# Patient Record
Sex: MALE | Race: WHITE | NOT HISPANIC OR LATINO | Employment: FULL TIME | URBAN - METROPOLITAN AREA
[De-identification: names, ages, dates, MRNs, and addresses within clinical notes are randomized per-mention and may not be internally consistent; named-entity substitution may affect disease eponyms.]

---

## 2020-10-27 ENCOUNTER — HOSPITAL ENCOUNTER (EMERGENCY)
Facility: HOSPITAL | Age: 55
Discharge: HOME | End: 2020-10-27
Attending: EMERGENCY MEDICINE
Payer: COMMERCIAL

## 2020-10-27 ENCOUNTER — OFFICE VISIT (OUTPATIENT)
Dept: URGENT CARE | Facility: CLINIC | Age: 55
End: 2020-10-27
Payer: COMMERCIAL

## 2020-10-27 ENCOUNTER — APPOINTMENT (EMERGENCY)
Dept: CT IMAGING | Facility: HOSPITAL | Age: 55
End: 2020-10-27
Payer: COMMERCIAL

## 2020-10-27 VITALS
DIASTOLIC BLOOD PRESSURE: 89 MMHG | BODY MASS INDEX: 29.03 KG/M2 | WEIGHT: 185 LBS | SYSTOLIC BLOOD PRESSURE: 171 MMHG | HEIGHT: 67 IN | OXYGEN SATURATION: 98 % | RESPIRATION RATE: 18 BRPM | HEART RATE: 92 BPM | TEMPERATURE: 98.3 F

## 2020-10-27 VITALS
DIASTOLIC BLOOD PRESSURE: 93 MMHG | WEIGHT: 185 LBS | RESPIRATION RATE: 18 BRPM | OXYGEN SATURATION: 95 % | BODY MASS INDEX: 29.03 KG/M2 | HEART RATE: 93 BPM | SYSTOLIC BLOOD PRESSURE: 190 MMHG | HEIGHT: 67 IN | TEMPERATURE: 97.6 F

## 2020-10-27 DIAGNOSIS — R10.12 LEFT UPPER QUADRANT ABDOMINAL PAIN: Primary | ICD-10-CM

## 2020-10-27 DIAGNOSIS — R10.9 ABDOMINAL PAIN, UNSPECIFIED ABDOMINAL LOCATION: Primary | ICD-10-CM

## 2020-10-27 LAB
ALBUMIN SERPL BCP-MCNC: 3.9 G/DL (ref 3.5–5.7)
ALP SERPL-CCNC: 48 U/L (ref 40–150)
ALT SERPL W P-5'-P-CCNC: 21 U/L (ref 7–52)
ANION GAP SERPL CALCULATED.3IONS-SCNC: 8 MMOL/L (ref 4–13)
APTT PPP: 29 SECONDS (ref 23–37)
AST SERPL W P-5'-P-CCNC: 12 U/L (ref 13–39)
BASOPHILS # BLD AUTO: 0.1 THOUSANDS/ΜL (ref 0–0.1)
BASOPHILS NFR BLD AUTO: 1 % (ref 0–2)
BILIRUB SERPL-MCNC: 0.4 MG/DL (ref 0.2–1)
BUN SERPL-MCNC: 17 MG/DL (ref 7–25)
CALCIUM SERPL-MCNC: 9.2 MG/DL (ref 8.6–10.5)
CHLORIDE SERPL-SCNC: 100 MMOL/L (ref 98–107)
CO2 SERPL-SCNC: 27 MMOL/L (ref 21–31)
CREAT SERPL-MCNC: 0.98 MG/DL (ref 0.7–1.3)
EOSINOPHIL # BLD AUTO: 0.7 THOUSAND/ΜL (ref 0–0.61)
EOSINOPHIL NFR BLD AUTO: 5 % (ref 0–5)
ERYTHROCYTE [DISTWIDTH] IN BLOOD BY AUTOMATED COUNT: 13.2 % (ref 11.5–14.5)
GFR SERPL CREATININE-BSD FRML MDRD: 85 ML/MIN/1.73SQ M
GLUCOSE SERPL-MCNC: 276 MG/DL (ref 65–99)
GLUCOSE SERPL-MCNC: 309 MG/DL (ref 65–140)
HCT VFR BLD AUTO: 41.7 % (ref 42–47)
HGB BLD-MCNC: 14.4 G/DL (ref 14–18)
INR PPP: 1.06 (ref 0.84–1.19)
LIPASE SERPL-CCNC: 12 U/L (ref 11–82)
LYMPHOCYTES # BLD AUTO: 5.3 THOUSANDS/ΜL (ref 0.6–4.47)
LYMPHOCYTES NFR BLD AUTO: 41 % (ref 21–51)
MCH RBC QN AUTO: 31.9 PG (ref 26–34)
MCHC RBC AUTO-ENTMCNC: 34.5 G/DL (ref 31–37)
MCV RBC AUTO: 92 FL (ref 81–99)
MONOCYTES # BLD AUTO: 0.9 THOUSAND/ΜL (ref 0.17–1.22)
MONOCYTES NFR BLD AUTO: 7 % (ref 2–12)
NEUTROPHILS # BLD AUTO: 6 THOUSANDS/ΜL (ref 1.4–6.5)
NEUTS SEG NFR BLD AUTO: 46 % (ref 42–75)
PLATELET # BLD AUTO: 224 THOUSANDS/UL (ref 149–390)
PMV BLD AUTO: 7.7 FL (ref 8.6–11.7)
POTASSIUM SERPL-SCNC: 4 MMOL/L (ref 3.5–5.5)
PROT SERPL-MCNC: 6.7 G/DL (ref 6.4–8.9)
PROTHROMBIN TIME: 13.7 SECONDS (ref 11.6–14.5)
RBC # BLD AUTO: 4.52 MILLION/UL (ref 4.3–5.9)
SL AMB  POCT GLUCOSE, UA: 2000
SL AMB LEUKOCYTE ESTERASE,UA: ABNORMAL
SL AMB POCT BILIRUBIN,UA: ABNORMAL
SL AMB POCT BLOOD,UA: ABNORMAL
SL AMB POCT CLARITY,UA: CLEAR
SL AMB POCT COLOR,UA: ABNORMAL
SL AMB POCT KETONES,UA: ABNORMAL
SL AMB POCT NITRITE,UA: ABNORMAL
SL AMB POCT PH,UA: 6
SL AMB POCT SPECIFIC GRAVITY,UA: 1.02
SL AMB POCT URINE PROTEIN: 30
SL AMB POCT UROBILINOGEN: 0.2
SODIUM SERPL-SCNC: 135 MMOL/L (ref 134–143)
WBC # BLD AUTO: 12.9 THOUSAND/UL (ref 4.8–10.8)

## 2020-10-27 PROCEDURE — 85025 COMPLETE CBC W/AUTO DIFF WBC: CPT | Performed by: EMERGENCY MEDICINE

## 2020-10-27 PROCEDURE — 99284 EMERGENCY DEPT VISIT MOD MDM: CPT

## 2020-10-27 PROCEDURE — 81002 URINALYSIS NONAUTO W/O SCOPE: CPT | Performed by: PHYSICIAN ASSISTANT

## 2020-10-27 PROCEDURE — 83690 ASSAY OF LIPASE: CPT | Performed by: EMERGENCY MEDICINE

## 2020-10-27 PROCEDURE — 82948 REAGENT STRIP/BLOOD GLUCOSE: CPT | Performed by: PHYSICIAN ASSISTANT

## 2020-10-27 PROCEDURE — 85730 THROMBOPLASTIN TIME PARTIAL: CPT | Performed by: EMERGENCY MEDICINE

## 2020-10-27 PROCEDURE — 74177 CT ABD & PELVIS W/CONTRAST: CPT

## 2020-10-27 PROCEDURE — 36415 COLL VENOUS BLD VENIPUNCTURE: CPT | Performed by: EMERGENCY MEDICINE

## 2020-10-27 PROCEDURE — 99213 OFFICE O/P EST LOW 20 MIN: CPT | Performed by: PHYSICIAN ASSISTANT

## 2020-10-27 PROCEDURE — 99284 EMERGENCY DEPT VISIT MOD MDM: CPT | Performed by: EMERGENCY MEDICINE

## 2020-10-27 PROCEDURE — 85610 PROTHROMBIN TIME: CPT | Performed by: EMERGENCY MEDICINE

## 2020-10-27 PROCEDURE — 80053 COMPREHEN METABOLIC PANEL: CPT | Performed by: EMERGENCY MEDICINE

## 2020-10-27 PROCEDURE — 93005 ELECTROCARDIOGRAM TRACING: CPT | Performed by: PHYSICIAN ASSISTANT

## 2020-10-27 PROCEDURE — G1004 CDSM NDSC: HCPCS

## 2020-10-27 RX ORDER — LISINOPRIL 10 MG/1
10 TABLET ORAL
Status: ON HOLD | COMMUNITY
End: 2021-09-24 | Stop reason: SDUPTHER

## 2020-10-27 RX ORDER — ROSUVASTATIN CALCIUM 40 MG/1
40 TABLET, COATED ORAL DAILY
COMMUNITY
Start: 2020-04-01 | End: 2021-09-24 | Stop reason: HOSPADM

## 2020-10-27 RX ORDER — INSULIN GLARGINE 100 [IU]/ML
INJECTION, SOLUTION SUBCUTANEOUS
COMMUNITY
Start: 2020-10-08

## 2020-10-27 RX ORDER — GLIMEPIRIDE 2 MG/1
2 TABLET ORAL DAILY
COMMUNITY
Start: 2020-04-01 | End: 2021-09-24 | Stop reason: HOSPADM

## 2020-10-27 RX ADMIN — IOHEXOL 100 ML: 350 INJECTION, SOLUTION INTRAVENOUS at 21:27

## 2020-10-28 LAB
ATRIAL RATE: 93 BPM
P AXIS: 68 DEGREES
PR INTERVAL: 144 MS
QRS AXIS: 8 DEGREES
QRSD INTERVAL: 84 MS
QT INTERVAL: 342 MS
QTC INTERVAL: 425 MS
T WAVE AXIS: 53 DEGREES
VENTRICULAR RATE: 93 BPM

## 2020-10-28 PROCEDURE — 93010 ELECTROCARDIOGRAM REPORT: CPT | Performed by: INTERNAL MEDICINE

## 2021-09-22 ENCOUNTER — APPOINTMENT (EMERGENCY)
Dept: CT IMAGING | Facility: HOSPITAL | Age: 56
DRG: 065 | End: 2021-09-22
Payer: COMMERCIAL

## 2021-09-22 ENCOUNTER — HOSPITAL ENCOUNTER (INPATIENT)
Facility: HOSPITAL | Age: 56
LOS: 1 days | Discharge: HOME | DRG: 065 | End: 2021-09-24
Attending: EMERGENCY MEDICINE | Admitting: INTERNAL MEDICINE
Payer: COMMERCIAL

## 2021-09-22 ENCOUNTER — OFFICE VISIT (OUTPATIENT)
Dept: URGENT CARE | Facility: CLINIC | Age: 56
End: 2021-09-22
Payer: COMMERCIAL

## 2021-09-22 ENCOUNTER — APPOINTMENT (EMERGENCY)
Dept: RADIOLOGY | Facility: HOSPITAL | Age: 56
DRG: 065 | End: 2021-09-22
Payer: COMMERCIAL

## 2021-09-22 VITALS
HEIGHT: 67 IN | TEMPERATURE: 98.3 F | DIASTOLIC BLOOD PRESSURE: 91 MMHG | HEART RATE: 96 BPM | OXYGEN SATURATION: 98 % | WEIGHT: 185 LBS | SYSTOLIC BLOOD PRESSURE: 156 MMHG | BODY MASS INDEX: 29.03 KG/M2 | RESPIRATION RATE: 18 BRPM

## 2021-09-22 DIAGNOSIS — R90.89 ABNORMAL CT OF BRAIN: Primary | ICD-10-CM

## 2021-09-22 DIAGNOSIS — R51.9 ACUTE HEADACHE: ICD-10-CM

## 2021-09-22 DIAGNOSIS — I63.9 CEREBROVASCULAR ACCIDENT (CVA), UNSPECIFIED MECHANISM (HCC): ICD-10-CM

## 2021-09-22 DIAGNOSIS — I63.9 ACUTE CVA (CEREBROVASCULAR ACCIDENT) (HCC): ICD-10-CM

## 2021-09-22 DIAGNOSIS — R51.9 SEVERE HEADACHE: Primary | ICD-10-CM

## 2021-09-22 DIAGNOSIS — G93.9 BRAIN LESION: ICD-10-CM

## 2021-09-22 LAB
ALBUMIN SERPL BCP-MCNC: 3.6 G/DL (ref 3.5–5)
ALP SERPL-CCNC: 62 U/L (ref 46–116)
ALT SERPL W P-5'-P-CCNC: 32 U/L (ref 12–78)
ANION GAP SERPL CALCULATED.3IONS-SCNC: 5 MMOL/L (ref 4–13)
AST SERPL W P-5'-P-CCNC: 12 U/L (ref 5–45)
BASOPHILS # BLD AUTO: 0.09 THOUSANDS/ΜL (ref 0–0.1)
BASOPHILS NFR BLD AUTO: 1 % (ref 0–1)
BILIRUB SERPL-MCNC: 0.56 MG/DL (ref 0.2–1)
BUN SERPL-MCNC: 14 MG/DL (ref 5–25)
CALCIUM SERPL-MCNC: 8.4 MG/DL (ref 8.3–10.1)
CHLORIDE SERPL-SCNC: 99 MMOL/L (ref 100–108)
CO2 SERPL-SCNC: 28 MMOL/L (ref 21–32)
CREAT SERPL-MCNC: 0.98 MG/DL (ref 0.6–1.3)
EOSINOPHIL # BLD AUTO: 0.08 THOUSAND/ΜL (ref 0–0.61)
EOSINOPHIL NFR BLD AUTO: 1 % (ref 0–6)
ERYTHROCYTE [DISTWIDTH] IN BLOOD BY AUTOMATED COUNT: 12.2 % (ref 11.6–15.1)
GFR SERPL CREATININE-BSD FRML MDRD: 85 ML/MIN/1.73SQ M
GLUCOSE SERPL-MCNC: 224 MG/DL (ref 65–140)
GLUCOSE SERPL-MCNC: 256 MG/DL (ref 65–140)
HCT VFR BLD AUTO: 45 % (ref 36.5–49.3)
HGB BLD-MCNC: 15.1 G/DL (ref 12–17)
IMM GRANULOCYTES # BLD AUTO: 0.05 THOUSAND/UL (ref 0–0.2)
IMM GRANULOCYTES NFR BLD AUTO: 0 % (ref 0–2)
LIPASE SERPL-CCNC: 72 U/L (ref 73–393)
LYMPHOCYTES # BLD AUTO: 5.72 THOUSANDS/ΜL (ref 0.6–4.47)
LYMPHOCYTES NFR BLD AUTO: 36 % (ref 14–44)
MAGNESIUM SERPL-MCNC: 2 MG/DL (ref 1.6–2.6)
MCH RBC QN AUTO: 31.3 PG (ref 26.8–34.3)
MCHC RBC AUTO-ENTMCNC: 33.6 G/DL (ref 31.4–37.4)
MCV RBC AUTO: 93 FL (ref 82–98)
MONOCYTES # BLD AUTO: 0.72 THOUSAND/ΜL (ref 0.17–1.22)
MONOCYTES NFR BLD AUTO: 5 % (ref 4–12)
NEUTROPHILS # BLD AUTO: 9.12 THOUSANDS/ΜL (ref 1.85–7.62)
NEUTS SEG NFR BLD AUTO: 57 % (ref 43–75)
NRBC BLD AUTO-RTO: 0 /100 WBCS
PLATELET # BLD AUTO: 265 THOUSANDS/UL (ref 149–390)
PMV BLD AUTO: 9.5 FL (ref 8.9–12.7)
POTASSIUM SERPL-SCNC: 4.1 MMOL/L (ref 3.5–5.3)
PROT SERPL-MCNC: 7.2 G/DL (ref 6.4–8.2)
RBC # BLD AUTO: 4.82 MILLION/UL (ref 3.88–5.62)
SODIUM SERPL-SCNC: 132 MMOL/L (ref 136–145)
TROPONIN I SERPL-MCNC: <0.02 NG/ML
WBC # BLD AUTO: 15.78 THOUSAND/UL (ref 4.31–10.16)

## 2021-09-22 PROCEDURE — 83690 ASSAY OF LIPASE: CPT | Performed by: EMERGENCY MEDICINE

## 2021-09-22 PROCEDURE — 70496 CT ANGIOGRAPHY HEAD: CPT

## 2021-09-22 PROCEDURE — 71045 X-RAY EXAM CHEST 1 VIEW: CPT

## 2021-09-22 PROCEDURE — 99285 EMERGENCY DEPT VISIT HI MDM: CPT | Performed by: EMERGENCY MEDICINE

## 2021-09-22 PROCEDURE — G1004 CDSM NDSC: HCPCS

## 2021-09-22 PROCEDURE — 85025 COMPLETE CBC W/AUTO DIFF WBC: CPT | Performed by: EMERGENCY MEDICINE

## 2021-09-22 PROCEDURE — 96375 TX/PRO/DX INJ NEW DRUG ADDON: CPT

## 2021-09-22 PROCEDURE — 70498 CT ANGIOGRAPHY NECK: CPT

## 2021-09-22 PROCEDURE — 70450 CT HEAD/BRAIN W/O DYE: CPT

## 2021-09-22 PROCEDURE — 99285 EMERGENCY DEPT VISIT HI MDM: CPT

## 2021-09-22 PROCEDURE — 93005 ELECTROCARDIOGRAM TRACING: CPT

## 2021-09-22 PROCEDURE — 80053 COMPREHEN METABOLIC PANEL: CPT | Performed by: EMERGENCY MEDICINE

## 2021-09-22 PROCEDURE — 84484 ASSAY OF TROPONIN QUANT: CPT | Performed by: EMERGENCY MEDICINE

## 2021-09-22 PROCEDURE — 82948 REAGENT STRIP/BLOOD GLUCOSE: CPT

## 2021-09-22 PROCEDURE — 83735 ASSAY OF MAGNESIUM: CPT | Performed by: EMERGENCY MEDICINE

## 2021-09-22 PROCEDURE — 36415 COLL VENOUS BLD VENIPUNCTURE: CPT | Performed by: EMERGENCY MEDICINE

## 2021-09-22 PROCEDURE — 96361 HYDRATE IV INFUSION ADD-ON: CPT

## 2021-09-22 PROCEDURE — 96374 THER/PROPH/DIAG INJ IV PUSH: CPT

## 2021-09-22 RX ORDER — SODIUM CHLORIDE 9 MG/ML
3 INJECTION INTRAVENOUS
Status: DISCONTINUED | OUTPATIENT
Start: 2021-09-22 | End: 2021-09-24 | Stop reason: HOSPADM

## 2021-09-22 RX ORDER — AMLODIPINE BESYLATE 2.5 MG/1
2.5 TABLET ORAL DAILY
COMMUNITY
Start: 2021-09-21 | End: 2022-09-21

## 2021-09-22 RX ORDER — METOCLOPRAMIDE HYDROCHLORIDE 5 MG/ML
10 INJECTION INTRAMUSCULAR; INTRAVENOUS ONCE
Status: COMPLETED | OUTPATIENT
Start: 2021-09-22 | End: 2021-09-22

## 2021-09-22 RX ORDER — LORAZEPAM 2 MG/ML
2 INJECTION INTRAMUSCULAR ONCE
Status: COMPLETED | OUTPATIENT
Start: 2021-09-22 | End: 2021-09-22

## 2021-09-22 RX ADMIN — METOCLOPRAMIDE HYDROCHLORIDE 10 MG: 5 INJECTION INTRAMUSCULAR; INTRAVENOUS at 21:36

## 2021-09-22 RX ADMIN — SODIUM CHLORIDE 1000 ML: 0.9 INJECTION, SOLUTION INTRAVENOUS at 21:41

## 2021-09-22 RX ADMIN — SODIUM CHLORIDE 1000 ML: 0.9 INJECTION, SOLUTION INTRAVENOUS at 21:35

## 2021-09-22 RX ADMIN — LORAZEPAM 2 MG: 2 INJECTION INTRAMUSCULAR; INTRAVENOUS at 21:36

## 2021-09-22 NOTE — LETTER
[facility] UNIT  [address] [facility] [address]  Dept: [phone]    September 24, 2021     Patient: Deejay Germain   YOB: 1965   Date of Visit: 9/22/2021       To Whom it May Concern:    Kelly Bryant is under my professional care  He was seen in the hospital from 9/22/2021   to 09/24/21  He may return to work on 9/27/21 without limitations  If you have any questions or concerns, please don't hesitate to call             Sincerely,            aIin Garcia PA-C

## 2021-09-22 NOTE — PROGRESS NOTES
[address] Now        NAME: Ami Manning is a 60 y o  male  : 1965    MRN: [id number]  DATE: 2021  TIME: 8:15 PM    Assessment and Plan   Severe headache [R51 9]  1  Severe headache  Transfer to other facility         Patient Instructions     Patient Instructions   As we discussed would recommend go to the ER for further evaluation  You were agreeable  Your wife will drive you  Order placed in epic      Chief Complaint     Chief Complaint   Patient presents with    Headache     Patient c/o headache that started yesterday, c/o tunnel vision  History of Present Illness   Ami Manning presents to the clinic c/o    This is a 60year old male here today with complaints of headache  He states symptoms started yesterday  He states it is 7/10 on the right side  He is having nausea, no vomiting  He states he feels as though he is having tunnel vision  Pain is behind the right eye  When driving yesterday he could not see that tail light of another care  He is a diabetic  Last hemoglobin A1C was 11  He was started on amlodipine yesterday  No URI symptoms  He denies any other weakness  He states headache started yesterday and was gradual onset but has now worsened since yesterday  This is now the worse headache he has had  He has some dizziness  Wife states he has been sleeping most of the day today  Review of Systems   Review of Systems   Constitutional: Negative for activity change, chills, fatigue and fever  HENT: Negative for sinus pressure and sinus pain  Eyes: Positive for visual disturbance  Negative for photophobia and redness  Respiratory: Negative  Cardiovascular: Negative  Neurological: Negative  Psychiatric/Behavioral: Negative            Current Medications     Long-Term Medications   Medication Sig Dispense Refill    amLODIPine (NORVASC) 2 5 mg tablet Take 2 5 mg by mouth daily      Lantus SoloStar 100 units/mL injection pen INJECT 60 UNITS UNDER THE SKIN DAILY     (REFER TO PRESCRIPTION NOTES)   metFORMIN (GLUCOPHAGE) 1000 MG tablet Take 1 tablet by mouth daily      glimepiride (AMARYL) 2 mg tablet Take 2 mg by mouth daily (Patient not taking: Reported on 9/22/2021)      lisinopril (ZESTRIL) 10 mg tablet Take 10 mg by mouth (Patient not taking: Reported on 9/22/2021)      rosuvastatin (CRESTOR) 40 MG tablet Take 40 mg by mouth daily (Patient not taking: Reported on 9/22/2021)         Current Allergies     Allergies as of 09/22/2021    (No Known Allergies)            The following portions of the patient's history were reviewed and updated as appropriate: allergies, current medications, past family history, past medical history, past social history, past surgical history and problem list     Objective   /91   Pulse 96   Temp 98 3 °F (36 8 °C) (Temporal)   Resp 18   Ht 5' 7" (1 702 m)   Wt 83 9 kg (185 lb)   SpO2 98%   BMI 28 98 kg/m²        Physical Exam     Physical Exam  Vitals and nursing note reviewed  Constitutional:       General: He is not in acute distress  Appearance: Normal appearance  He is not ill-appearing or toxic-appearing  Cardiovascular:      Rate and Rhythm: Normal rate and regular rhythm  Pulses: Normal pulses  Heart sounds: Normal heart sounds  Pulmonary:      Effort: Pulmonary effort is normal  No respiratory distress  Breath sounds: Normal breath sounds  No stridor  No wheezing, rhonchi or rales  Chest:      Chest wall: No tenderness  Neurological:      Mental Status: He is alert and oriented to person, place, and time  Cranial Nerves: No cranial nerve deficit  Sensory: No sensory deficit  Motor: No weakness  Coordination: Coordination normal       Gait: Gait normal    Psychiatric:         Mood and Affect: Mood normal          Behavior: Behavior normal          Thought Content:  Thought content normal          Judgment: Judgment normal

## 2021-09-22 NOTE — PATIENT INSTRUCTIONS
As we discussed would recommend go to the ER for further evaluation  You were agreeable  Your wife will drive you    Order placed in epic

## 2021-09-23 ENCOUNTER — APPOINTMENT (OUTPATIENT)
Dept: MRI IMAGING | Facility: HOSPITAL | Age: 56
DRG: 065 | End: 2021-09-23
Payer: COMMERCIAL

## 2021-09-23 PROBLEM — R93.0 ABNORMAL CT OF THE HEAD: Status: ACTIVE | Noted: 2021-09-23

## 2021-09-23 PROBLEM — Z79.4 TYPE 2 DIABETES MELLITUS WITH HYPERGLYCEMIA, WITH LONG-TERM CURRENT USE OF INSULIN (HCC): Status: ACTIVE | Noted: 2021-09-23

## 2021-09-23 PROBLEM — R51.9 HEADACHE, UNSPECIFIED: Status: ACTIVE | Noted: 2021-09-23

## 2021-09-23 PROBLEM — D72.829 LEUKOCYTOSIS: Status: ACTIVE | Noted: 2021-09-23

## 2021-09-23 PROBLEM — I10 ESSENTIAL HYPERTENSION: Status: ACTIVE | Noted: 2021-09-23

## 2021-09-23 PROBLEM — E11.65 TYPE 2 DIABETES MELLITUS WITH HYPERGLYCEMIA, WITH LONG-TERM CURRENT USE OF INSULIN (HCC): Status: ACTIVE | Noted: 2021-09-23

## 2021-09-23 PROBLEM — E87.1 HYPONATREMIA: Status: ACTIVE | Noted: 2021-09-23

## 2021-09-23 PROBLEM — G93.9 BRAIN LESION: Status: ACTIVE | Noted: 2021-09-23

## 2021-09-23 LAB
ALBUMIN SERPL BCP-MCNC: 3 G/DL (ref 3.5–5)
ALP SERPL-CCNC: 45 U/L (ref 46–116)
ALT SERPL W P-5'-P-CCNC: 26 U/L (ref 12–78)
ANION GAP SERPL CALCULATED.3IONS-SCNC: 5 MMOL/L (ref 4–13)
AST SERPL W P-5'-P-CCNC: 9 U/L (ref 5–45)
ATRIAL RATE: 77 BPM
BACTERIA UR QL AUTO: ABNORMAL /HPF
BILIRUB SERPL-MCNC: 0.49 MG/DL (ref 0.2–1)
BILIRUB UR QL STRIP: NEGATIVE
BUN SERPL-MCNC: 14 MG/DL (ref 5–25)
CALCIUM ALBUM COR SERPL-MCNC: 8.6 MG/DL (ref 8.3–10.1)
CALCIUM SERPL-MCNC: 7.8 MG/DL (ref 8.3–10.1)
CHLORIDE SERPL-SCNC: 104 MMOL/L (ref 100–108)
CLARITY UR: CLEAR
CO2 SERPL-SCNC: 28 MMOL/L (ref 21–32)
COLOR UR: YELLOW
CREAT SERPL-MCNC: 0.92 MG/DL (ref 0.6–1.3)
ERYTHROCYTE [DISTWIDTH] IN BLOOD BY AUTOMATED COUNT: 12.3 % (ref 11.6–15.1)
EST. AVERAGE GLUCOSE BLD GHB EST-MCNC: 263 MG/DL
GFR SERPL CREATININE-BSD FRML MDRD: 91 ML/MIN/1.73SQ M
GLUCOSE SERPL-MCNC: 167 MG/DL (ref 65–140)
GLUCOSE SERPL-MCNC: 190 MG/DL (ref 65–140)
GLUCOSE SERPL-MCNC: 235 MG/DL (ref 65–140)
GLUCOSE SERPL-MCNC: 243 MG/DL (ref 65–140)
GLUCOSE SERPL-MCNC: 277 MG/DL (ref 65–140)
GLUCOSE UR STRIP-MCNC: ABNORMAL MG/DL
HBA1C MFR BLD: 10.8 %
HCT VFR BLD AUTO: 39.8 % (ref 36.5–49.3)
HGB BLD-MCNC: 13.3 G/DL (ref 12–17)
HGB UR QL STRIP.AUTO: NEGATIVE
KETONES UR STRIP-MCNC: ABNORMAL MG/DL
LEUKOCYTE ESTERASE UR QL STRIP: ABNORMAL
MAGNESIUM SERPL-MCNC: 2 MG/DL (ref 1.6–2.6)
MCH RBC QN AUTO: 31.4 PG (ref 26.8–34.3)
MCHC RBC AUTO-ENTMCNC: 33.4 G/DL (ref 31.4–37.4)
MCV RBC AUTO: 94 FL (ref 82–98)
MUCOUS THREADS UR QL AUTO: ABNORMAL
NITRITE UR QL STRIP: NEGATIVE
NON-SQ EPI CELLS URNS QL MICRO: ABNORMAL /HPF
OSMOLALITY UR: 690 MMOL/KG
P AXIS: 69 DEGREES
PH UR STRIP.AUTO: 6 [PH]
PHOSPHATE SERPL-MCNC: 3.2 MG/DL (ref 2.7–4.5)
PLATELET # BLD AUTO: 234 THOUSANDS/UL (ref 149–390)
PMV BLD AUTO: 9.6 FL (ref 8.9–12.7)
POTASSIUM SERPL-SCNC: 3.7 MMOL/L (ref 3.5–5.3)
PR INTERVAL: 150 MS
PROCALCITONIN SERPL-MCNC: <0.05 NG/ML
PROT SERPL-MCNC: 6 G/DL (ref 6.4–8.2)
PROT UR STRIP-MCNC: NEGATIVE MG/DL
QRS AXIS: 16 DEGREES
QRSD INTERVAL: 80 MS
QT INTERVAL: 374 MS
QTC INTERVAL: 423 MS
RBC # BLD AUTO: 4.24 MILLION/UL (ref 3.88–5.62)
RBC #/AREA URNS AUTO: ABNORMAL /HPF
SARS-COV-2 RNA RESP QL NAA+PROBE: NEGATIVE
SODIUM 24H UR-SCNC: 122 MOL/L
SODIUM SERPL-SCNC: 137 MMOL/L (ref 136–145)
SP GR UR STRIP.AUTO: 1.01 (ref 1–1.03)
T WAVE AXIS: 36 DEGREES
TSH SERPL DL<=0.05 MIU/L-ACNC: 2.27 UIU/ML (ref 0.36–3.74)
UROBILINOGEN UR QL STRIP.AUTO: 0.2 E.U./DL
VENTRICULAR RATE: 77 BPM
WBC # BLD AUTO: 15.74 THOUSAND/UL (ref 4.31–10.16)
WBC #/AREA URNS AUTO: ABNORMAL /HPF

## 2021-09-23 PROCEDURE — 87040 BLOOD CULTURE FOR BACTERIA: CPT | Performed by: PHYSICIAN ASSISTANT

## 2021-09-23 PROCEDURE — 83935 ASSAY OF URINE OSMOLALITY: CPT | Performed by: PHYSICIAN ASSISTANT

## 2021-09-23 PROCEDURE — U0005 INFEC AGEN DETEC AMPLI PROBE: HCPCS | Performed by: EMERGENCY MEDICINE

## 2021-09-23 PROCEDURE — A9585 GADOBUTROL INJECTION: HCPCS | Performed by: INTERNAL MEDICINE

## 2021-09-23 PROCEDURE — 93010 ELECTROCARDIOGRAM REPORT: CPT | Performed by: INTERNAL MEDICINE

## 2021-09-23 PROCEDURE — 70553 MRI BRAIN STEM W/O & W/DYE: CPT

## 2021-09-23 PROCEDURE — 84443 ASSAY THYROID STIM HORMONE: CPT | Performed by: PHYSICIAN ASSISTANT

## 2021-09-23 PROCEDURE — 80053 COMPREHEN METABOLIC PANEL: CPT | Performed by: PHYSICIAN ASSISTANT

## 2021-09-23 PROCEDURE — 97162 PT EVAL MOD COMPLEX 30 MIN: CPT

## 2021-09-23 PROCEDURE — G0426 INPT/ED TELECONSULT50: HCPCS | Performed by: PSYCHIATRY & NEUROLOGY

## 2021-09-23 PROCEDURE — 85027 COMPLETE CBC AUTOMATED: CPT | Performed by: PHYSICIAN ASSISTANT

## 2021-09-23 PROCEDURE — 82948 REAGENT STRIP/BLOOD GLUCOSE: CPT

## 2021-09-23 PROCEDURE — 36415 COLL VENOUS BLD VENIPUNCTURE: CPT | Performed by: PHYSICIAN ASSISTANT

## 2021-09-23 PROCEDURE — NC001 PR NO CHARGE: Performed by: EMERGENCY MEDICINE

## 2021-09-23 PROCEDURE — 83735 ASSAY OF MAGNESIUM: CPT | Performed by: PHYSICIAN ASSISTANT

## 2021-09-23 PROCEDURE — 99220 PR INITIAL OBSERVATION CARE/DAY 70 MINUTES: CPT | Performed by: INTERNAL MEDICINE

## 2021-09-23 PROCEDURE — 84100 ASSAY OF PHOSPHORUS: CPT | Performed by: PHYSICIAN ASSISTANT

## 2021-09-23 PROCEDURE — 83036 HEMOGLOBIN GLYCOSYLATED A1C: CPT | Performed by: PHYSICIAN ASSISTANT

## 2021-09-23 PROCEDURE — 81001 URINALYSIS AUTO W/SCOPE: CPT | Performed by: PHYSICIAN ASSISTANT

## 2021-09-23 PROCEDURE — U0003 INFECTIOUS AGENT DETECTION BY NUCLEIC ACID (DNA OR RNA); SEVERE ACUTE RESPIRATORY SYNDROME CORONAVIRUS 2 (SARS-COV-2) (CORONAVIRUS DISEASE [COVID-19]), AMPLIFIED PROBE TECHNIQUE, MAKING USE OF HIGH THROUGHPUT TECHNOLOGIES AS DESCRIBED BY CMS-2020-01-R: HCPCS | Performed by: EMERGENCY MEDICINE

## 2021-09-23 PROCEDURE — 84300 ASSAY OF URINE SODIUM: CPT | Performed by: PHYSICIAN ASSISTANT

## 2021-09-23 PROCEDURE — 84145 PROCALCITONIN (PCT): CPT | Performed by: PHYSICIAN ASSISTANT

## 2021-09-23 PROCEDURE — G1004 CDSM NDSC: HCPCS

## 2021-09-23 PROCEDURE — 97166 OT EVAL MOD COMPLEX 45 MIN: CPT

## 2021-09-23 RX ORDER — INSULIN GLARGINE 100 [IU]/ML
70 INJECTION, SOLUTION SUBCUTANEOUS EVERY MORNING
Status: DISCONTINUED | OUTPATIENT
Start: 2021-09-24 | End: 2021-09-24

## 2021-09-23 RX ORDER — INSULIN GLARGINE 100 [IU]/ML
60 INJECTION, SOLUTION SUBCUTANEOUS EVERY MORNING
Status: DISCONTINUED | OUTPATIENT
Start: 2021-09-23 | End: 2021-09-23

## 2021-09-23 RX ORDER — ONDANSETRON 2 MG/ML
4 INJECTION INTRAMUSCULAR; INTRAVENOUS EVERY 6 HOURS PRN
Status: DISCONTINUED | OUTPATIENT
Start: 2021-09-23 | End: 2021-09-24 | Stop reason: HOSPADM

## 2021-09-23 RX ORDER — ACETAMINOPHEN 325 MG/1
650 TABLET ORAL EVERY 4 HOURS PRN
Status: DISCONTINUED | OUTPATIENT
Start: 2021-09-23 | End: 2021-09-23

## 2021-09-23 RX ORDER — AMLODIPINE BESYLATE 2.5 MG/1
2.5 TABLET ORAL DAILY
Status: DISCONTINUED | OUTPATIENT
Start: 2021-09-23 | End: 2021-09-24 | Stop reason: HOSPADM

## 2021-09-23 RX ORDER — SODIUM CHLORIDE 9 MG/ML
125 INJECTION, SOLUTION INTRAVENOUS CONTINUOUS
Status: DISCONTINUED | OUTPATIENT
Start: 2021-09-23 | End: 2021-09-24 | Stop reason: HOSPADM

## 2021-09-23 RX ORDER — ASPIRIN 81 MG/1
81 TABLET, CHEWABLE ORAL DAILY
Status: DISCONTINUED | OUTPATIENT
Start: 2021-09-24 | End: 2021-09-24 | Stop reason: HOSPADM

## 2021-09-23 RX ORDER — ASPIRIN 325 MG
325 TABLET ORAL ONCE
Status: COMPLETED | OUTPATIENT
Start: 2021-09-23 | End: 2021-09-23

## 2021-09-23 RX ORDER — ATORVASTATIN CALCIUM 40 MG/1
40 TABLET, FILM COATED ORAL EVERY EVENING
Status: DISCONTINUED | OUTPATIENT
Start: 2021-09-23 | End: 2021-09-24 | Stop reason: HOSPADM

## 2021-09-23 RX ORDER — LABETALOL 20 MG/4 ML (5 MG/ML) INTRAVENOUS SYRINGE
5 ONCE
Status: COMPLETED | OUTPATIENT
Start: 2021-09-23 | End: 2021-09-23

## 2021-09-23 RX ORDER — ACETAMINOPHEN 325 MG/1
650 TABLET ORAL EVERY 6 HOURS PRN
Status: DISCONTINUED | OUTPATIENT
Start: 2021-09-23 | End: 2021-09-24 | Stop reason: HOSPADM

## 2021-09-23 RX ADMIN — INSULIN GLARGINE 60 UNITS: 100 INJECTION, SOLUTION SUBCUTANEOUS at 09:21

## 2021-09-23 RX ADMIN — INSULIN LISPRO 1 UNITS: 100 INJECTION, SOLUTION INTRAVENOUS; SUBCUTANEOUS at 21:08

## 2021-09-23 RX ADMIN — INSULIN LISPRO 2 UNITS: 100 INJECTION, SOLUTION INTRAVENOUS; SUBCUTANEOUS at 16:53

## 2021-09-23 RX ADMIN — ASPIRIN 325 MG: 325 TABLET ORAL at 18:24

## 2021-09-23 RX ADMIN — GADOBUTROL 8 ML: 604.72 INJECTION INTRAVENOUS at 09:20

## 2021-09-23 RX ADMIN — AMLODIPINE BESYLATE 2.5 MG: 2.5 TABLET ORAL at 09:23

## 2021-09-23 RX ADMIN — DESMOPRESSIN ACETATE 40 MG: 0.2 TABLET ORAL at 18:24

## 2021-09-23 RX ADMIN — SODIUM CHLORIDE 125 ML/HR: 0.9 INJECTION, SOLUTION INTRAVENOUS at 18:25

## 2021-09-23 RX ADMIN — INSULIN LISPRO 3 UNITS: 100 INJECTION, SOLUTION INTRAVENOUS; SUBCUTANEOUS at 07:13

## 2021-09-23 RX ADMIN — INSULIN LISPRO 3 UNITS: 100 INJECTION, SOLUTION INTRAVENOUS; SUBCUTANEOUS at 11:38

## 2021-09-23 RX ADMIN — IOHEXOL 85 ML: 350 INJECTION, SOLUTION INTRAVENOUS at 00:05

## 2021-09-23 RX ADMIN — LABETALOL HYDROCHLORIDE 5 MG: 5 INJECTION, SOLUTION INTRAVENOUS at 03:40

## 2021-09-23 NOTE — ED PROVIDER NOTES
History  Chief Complaint   Patient presents with    Headache     x2 day with tunnel vision  reports nausea and light sensitivity  tylenol last 9/21  denies head injury     HPI     Pt presents from home, hx of DM, HTN, c/o right, frontal headache, gradual onset, constant, radiates behind the right eye, worse w/ lights and sounds, improved with lying in a dark room, "throbbing," mild to moderate intensity and currently present  Pt has nausea as well  Pt is also describing scotomas as well from his right eye  Pt denies fevers, cough, cp, sob, n/v/d/c, abd pain, dysuria, focal def or syncope  Prior to Admission Medications   Prescriptions Last Dose Informant Patient Reported? Taking? Lantus SoloStar 100 units/mL injection pen 9/21/2021  Yes No   Sig: INJECT 60 UNITS UNDER THE SKIN DAILY     (REFER TO PRESCRIPTION NOTES)  amLODIPine (NORVASC) 2 5 mg tablet Not Taking at Unknown time  Yes No   Sig: Take 2 5 mg by mouth daily   Patient not taking: Reported on 9/23/2021   glimepiride (AMARYL) 2 mg tablet   Yes No   Sig: Take 2 mg by mouth daily   Patient not taking: Reported on 9/22/2021   lisinopril (ZESTRIL) 10 mg tablet   Yes No   Sig: Take 10 mg by mouth   Patient not taking: Reported on 9/22/2021   metFORMIN (GLUCOPHAGE) 1000 MG tablet 9/21/2021  Yes No   Sig: Take 1 tablet by mouth daily   rosuvastatin (CRESTOR) 40 MG tablet   Yes No   Sig: Take 40 mg by mouth daily   Patient not taking: Reported on 9/22/2021      Facility-Administered Medications: None       Past Medical History:   Diagnosis Date    Diabetes mellitus ([facility] [address] )     Hypertension        Past Surgical History:   Procedure Laterality Date    CARDIAC SURGERY         History reviewed  No pertinent family history  I have reviewed and agree with the history as documented      E-Cigarette/Vaping    E-Cigarette Use Never User      E-Cigarette/Vaping Substances     Social History     Tobacco Use    Smoking status: Never Smoker    Smokeless tobacco: Never Used   Vaping Use    Vaping Use: Never used   Substance Use Topics    Alcohol use: Yes     Comment: "a couple of beer per day"    Drug use: Never       Review of Systems   Constitutional: Negative for activity change, appetite change and fever  HENT: Negative for congestion, nosebleeds and sore throat  Eyes: Positive for photophobia and visual disturbance  Negative for discharge  Respiratory: Negative for cough, shortness of breath, wheezing and stridor  Cardiovascular: Negative for chest pain  Gastrointestinal: Negative for abdominal pain, constipation, diarrhea, nausea and vomiting  Endocrine: Negative for cold intolerance and polydipsia  Genitourinary: Negative for discharge, hematuria and penile pain  Musculoskeletal: Negative for arthralgias, myalgias and neck stiffness  Skin: Negative for color change and rash  Allergic/Immunologic: Negative for immunocompromised state  Neurological: Positive for headaches  Negative for dizziness and seizures  Hematological: Negative for adenopathy  Psychiatric/Behavioral: Negative for confusion and self-injury  The patient is not nervous/anxious  Physical Exam  Physical Exam  Vitals and nursing note reviewed  Constitutional:       General: He is not in acute distress  Appearance: He is well-developed  He is not diaphoretic  HENT:      Head: Normocephalic and atraumatic  Right Ear: External ear normal       Left Ear: External ear normal       Nose: Nose normal       Mouth/Throat:      Pharynx: No oropharyngeal exudate  Eyes:      General: No scleral icterus  Right eye: No discharge  Left eye: No discharge  Conjunctiva/sclera: Conjunctivae normal       Pupils: Pupils are equal, round, and reactive to light  Neck:      Thyroid: No thyromegaly  Vascular: No JVD  Trachea: No tracheal deviation     Cardiovascular:      Rate and Rhythm: Normal rate and regular rhythm  Heart sounds: Normal heart sounds  No murmur heard  No friction rub  No gallop  Pulmonary:      Effort: No respiratory distress  Breath sounds: Normal breath sounds  No stridor  No wheezing or rales  Chest:      Chest wall: No tenderness  Abdominal:      General: Bowel sounds are normal  There is no distension  Palpations: Abdomen is soft  There is no mass  Tenderness: There is no abdominal tenderness  There is no guarding or rebound  Musculoskeletal:         General: No tenderness or deformity  Normal range of motion  Cervical back: Normal range of motion and neck supple  Lymphadenopathy:      Cervical: No cervical adenopathy  Skin:     General: Skin is warm and dry  Coloration: Skin is not pale  Findings: No erythema or rash  Neurological:      Mental Status: He is alert and oriented to person, place, and time  Cranial Nerves: No cranial nerve deficit  Motor: No abnormal muscle tone  Coordination: Coordination normal       Deep Tendon Reflexes: Reflexes are normal and symmetric  Reflexes normal    Psychiatric:         Behavior: Behavior normal          Thought Content:  Thought content normal          Judgment: Judgment normal          Vital Signs  ED Triage Vitals [09/22/21 2037]   Temperature Pulse Respirations Blood Pressure SpO2   98 1 °F (36 7 °C) 78 18 (!) 174/90 98 %      Temp Source Heart Rate Source Patient Position - Orthostatic VS BP Location FiO2 (%)   Temporal Monitor Lying Left arm --      Pain Score       7           Vitals:    09/24/21 0118 09/24/21 0325 09/24/21 0514 09/24/21 0712   BP: 123/70 125/69 120/68 132/77   Pulse: 68 72 68 62   Patient Position - Orthostatic VS:    Lying         Visual Acuity  Visual Acuity      Most Recent Value   L Pupil Size (mm)  3   R Pupil Size (mm)  3   L Pupil Shape  Round   R Pupil Shape  Round          ED Medications  Medications   sodium chloride 0 9 % bolus 1,000 mL (0 mL Intravenous Stopped 9/23/21 0043)   sodium chloride 0 9 % bolus 1,000 mL (0 mL Intravenous Stopped 9/23/21 0043)   metoclopramide (REGLAN) injection 10 mg (10 mg Intravenous Given 9/22/21 2136)   LORazepam (ATIVAN) injection 2 mg (2 mg Intravenous Given 9/22/21 2136)   iohexol (OMNIPAQUE) 350 MG/ML injection (SINGLE-DOSE) 85 mL (85 mL Intravenous Given 9/23/21 0005)   Labetalol HCl (NORMODYNE) injection 5 mg (5 mg Intravenous Given 9/23/21 0340)   Gadobutrol injection (SINGLE-DOSE) SOLN 8 mL (8 mL Intravenous Given 9/23/21 0920)   aspirin tablet 325 mg (325 mg Oral Given 9/23/21 1824)       Diagnostic Studies  Results Reviewed     Procedure Component Value Units Date/Time    Blood culture [[id number]] Collected: 09/23/21 0245    Lab Status: Final result Specimen: Blood from Arm, Left Updated: 09/28/21 0901     Blood Culture No Growth After 5 Days  Blood culture [[id number]] Collected: 09/23/21 0245    Lab Status: Final result Specimen: Blood from Arm, Right Updated: 09/28/21 0901     Blood Culture No Growth After 5 Days      Procalcitonin Reflex [[id number]]  (Normal) Collected: 09/24/21 0518    Lab Status: Final result Specimen: Blood from Arm, Left Updated: 09/24/21 1527     Procalcitonin <0 05 ng/ml     Lipid Panel with Direct LDL reflex [[id number]]  (Abnormal) Collected: 09/24/21 0518    Lab Status: Final result Specimen: Blood from Arm, Left Updated: 09/24/21 4173     Cholesterol 247 mg/dL      Triglycerides 99 mg/dL      HDL, Direct 48 mg/dL      LDL Calculated 179 mg/dL     UA w Reflex to Microscopic w Reflex to Culture [[id number]] Collected: 09/24/21 0139    Lab Status: Final result Specimen: Urine, Clean Catch Updated: 09/24/21 0151     Color, UA Yellow     Clarity, UA Clear     Specific Gravity, UA 1 015     pH, UA 6 0     Leukocytes, UA Negative     Nitrite, UA Negative     Protein, UA Negative mg/dl      Glucose, UA Negative mg/dl      Ketones, UA Negative mg/dl      Urobilinogen, UA 1 0 E U /dl      Bilirubin, UA Negative     Blood, UA Negative    Fingerstick Glucose (POCT) [[id number]]  (Abnormal) Collected: 09/23/21 1617    Lab Status: Final result Updated: 09/23/21 1618     POC Glucose 190 mg/dl     Procalcitonin [[id number]]  (Normal) Collected: 09/23/21 0621    Lab Status: Final result Specimen: Blood from Arm, Right Updated: 09/23/21 1256     Procalcitonin <0 05 ng/ml     Fingerstick Glucose (POCT) [[id number]]  (Abnormal) Collected: 09/23/21 1135    Lab Status: Final result Updated: 09/23/21 1136     POC Glucose 243 mg/dl     Sodium, urine, random [[id number]] Collected: 09/23/21 0246    Lab Status: Final result Specimen: Urine, Clean Catch Updated: 09/23/21 0845     Sodium, Ur 122    Hemoglobin A1c w/EAG Estimation (Orders if not completed within the last 90 days) [[id number]]  (Abnormal) Collected: 09/23/21 0245    Lab Status: Final result Specimen: Blood from Arm, Left Updated: 09/23/21 0842     Hemoglobin A1C 10 8 %       mg/dl     Osmolality, urine [[id number]]  (Normal) Collected: 09/23/21 0246    Lab Status: Final result Specimen: Urine, Clean Catch Updated: 09/23/21 0837     Osmolality, Ur 690 mmol/KG     Comprehensive metabolic panel [[id number]]  (Abnormal) Collected: 09/23/21 0621    Lab Status: Final result Specimen: Blood from Arm, Right Updated: 09/23/21 0709     Sodium 137 mmol/L      Potassium 3 7 mmol/L      Chloride 104 mmol/L      CO2 28 mmol/L      ANION GAP 5 mmol/L      BUN 14 mg/dL      Creatinine 0 92 mg/dL      Glucose 277 mg/dL      Calcium 7 8 mg/dL      Corrected Calcium 8 6 mg/dL      AST 9 U/L      ALT 26 U/L      Alkaline Phosphatase 45 U/L      Total Protein 6 0 g/dL      Albumin 3 0 g/dL      Total Bilirubin 0 49 mg/dL      eGFR 91 ml/min/1 73sq m     Narrative:      [place] guidelines for Chronic Kidney Disease (CKD):     Stage 1 with normal or high GFR (GFR > 90 mL/min/1 73 square meters)    Stage 2 Mild CKD (GFR = 60-89 mL/min/1 73 square meters)   Stage 3A Moderate CKD (GFR = 45-59 mL/min/1 73 square meters)    Stage 3B Moderate CKD (GFR = 30-44 mL/min/1 73 square meters)    Stage 4 Severe CKD (GFR = 15-29 mL/min/1 73 square meters)    Stage 5 End Stage CKD (GFR <15 mL/min/1 73 square meters)  Note: GFR calculation is accurate only with a steady state creatinine    Magnesium [[id number]]  (Normal) Collected: 09/23/21 0621    Lab Status: Final result Specimen: Blood from Arm, Right Updated: 09/23/21 0707     Magnesium 2 0 mg/dL     Phosphorus [[id number]]  (Normal) Collected: 09/23/21 0621    Lab Status: Final result Specimen: Blood from Arm, Right Updated: 09/23/21 0707     Phosphorus 3 2 mg/dL     Fingerstick Glucose (POCT) [[id number]]  (Abnormal) Collected: 09/23/21 0644    Lab Status: Final result Updated: 09/23/21 0644     POC Glucose 235 mg/dl     CBC (With Platelets) [[id number]]  (Abnormal) Collected: 09/23/21 0621    Lab Status: Final result Specimen: Blood from Arm, Right Updated: 09/23/21 0642     WBC 15 74 Thousand/uL      RBC 4 24 Million/uL      Hemoglobin 13 3 g/dL      Hematocrit 39 8 %      MCV 94 fL      MCH 31 4 pg      MCHC 33 4 g/dL      RDW 12 3 %      Platelets 747 Thousands/uL      MPV 9 6 fL     TSH, 3rd generation [[id number]]  (Normal) Collected: 09/23/21 0245    Lab Status: Final result Specimen: Blood from Arm, Left Updated: 09/23/21 0334     TSH 3RD GENERATON 2 265 uIU/mL     Narrative:      Patients undergoing fluorescein dye angiography may retain small amounts of fluorescein in the body for 48-72 hours post procedure  Samples containing fluorescein can produce falsely depressed TSH values  If the patient had this procedure,a specimen should be resubmitted post fluorescein clearance        Urine Microscopic [[id number]]  (Abnormal) Collected: 09/23/21 0245    Lab Status: Final result Specimen: Urine, Clean Catch Updated: 09/23/21 0306     RBC, UA None Seen /hpf      WBC, UA None Seen /hpf      Epithelial Cells None Seen /hpf Bacteria, UA Occasional /hpf      MUCUS THREADS Occasional    UA w Reflex to Microscopic w Reflex to Culture [[id number]]  (Abnormal) Collected: 09/23/21 0245    Lab Status: Final result Specimen: Urine, Clean Catch Updated: 09/23/21 0256     Color, UA Yellow     Clarity, UA Clear     Specific Gravity, UA 1 015     pH, UA 6 0     Leukocytes, UA Elevated glucose may cause decreased leukocyte values  See urine microscopic for [facility] result/     Nitrite, UA Negative     Protein, UA Negative mg/dl      Glucose, UA >=1000 (1%) mg/dl      Ketones, UA 40 (2+) mg/dl      Urobilinogen, UA 0 2 E U /dl      Bilirubin, UA Negative     Blood, UA Negative    Novel Coronavirus (Covid-19),PCR [facility] - 2 Hour Stat [[id number]]  (Normal) Collected: 09/23/21 0132    Lab Status: Final result Specimen: Nasopharyngeal Swab Updated: 09/23/21 0238     SARS-CoV-2 Negative    Narrative:      FOR PEDIATRIC PATIENTS - copy/paste COVID Guidelines URL to browser: [email or web address]    The specimen collection materials, transport medium, and/or testing methodology utilized in the production of these test results have been proven to be reliable in a limited validation with an abbreviated program under the Emergency Utilization Authorization provided by the FDA  Testing reported as "Presumptive positive" will be confirmed with secondary testing to ensure result accuracy  Clinical caution and judgement should be used with the interpretation of these results with consideration of the clinical impression and other laboratory testing  Testing reported as "Positive" or "Negative" has been proven to be accurate according to standard laboratory validation requirements  All testing is performed with control materials showing appropriate reactivity at standard intervals      Troponin I [[id number]]  (Normal) Collected: 09/22/21 2136    Lab Status: Final result Specimen: Blood from Arm, Right Updated: 09/22/21 2208     Troponin I <0 02 ng/mL     Comprehensive metabolic panel [[id number]]  (Abnormal) Collected: 09/22/21 2136    Lab Status: Final result Specimen: Blood from Arm, Right Updated: 09/22/21 2207     Sodium 132 mmol/L      Potassium 4 1 mmol/L      Chloride 99 mmol/L      CO2 28 mmol/L      ANION GAP 5 mmol/L      BUN 14 mg/dL      Creatinine 0 98 mg/dL      Glucose 256 mg/dL      Calcium 8 4 mg/dL      AST 12 U/L      ALT 32 U/L      Alkaline Phosphatase 62 U/L      Total Protein 7 2 g/dL      Albumin 3 6 g/dL      Total Bilirubin 0 56 mg/dL      eGFR 85 ml/min/1 73sq m     Narrative:      [place] guidelines for Chronic Kidney Disease (CKD):     Stage 1 with normal or high GFR (GFR > 90 mL/min/1 73 square meters)    Stage 2 Mild CKD (GFR = 60-89 mL/min/1 73 square meters)    Stage 3A Moderate CKD (GFR = 45-59 mL/min/1 73 square meters)    Stage 3B Moderate CKD (GFR = 30-44 mL/min/1 73 square meters)    Stage 4 Severe CKD (GFR = 15-29 mL/min/1 73 square meters)    Stage 5 End Stage CKD (GFR <15 mL/min/1 73 square meters)  Note: GFR calculation is accurate only with a steady state creatinine    Lipase [[id number]]  (Abnormal) Collected: 09/22/21 2136    Lab Status: Final result Specimen: Blood from Arm, Right Updated: 09/22/21 2201     Lipase 72 u/L     Magnesium [[id number]]  (Normal) Collected: 09/22/21 2136    Lab Status: Final result Specimen: Blood from Arm, Right Updated: 09/22/21 2201     Magnesium 2 0 mg/dL     CBC and differential [[id number]]  (Abnormal) Collected: 09/22/21 2136    Lab Status: Final result Specimen: Blood from Arm, Right Updated: 09/22/21 2151     WBC 15 78 Thousand/uL      RBC 4 82 Million/uL      Hemoglobin 15 1 g/dL      Hematocrit 45 0 %      MCV 93 fL      MCH 31 3 pg      MCHC 33 6 g/dL      RDW 12 2 %      MPV 9 5 fL      Platelets 711 Thousands/uL      nRBC 0 /100 WBCs      Neutrophils Relative 57 %      Immat GRANS % 0 %      Lymphocytes Relative 36 %      Monocytes Relative 5 %      Eosinophils Relative 1 %      Basophils Relative 1 %      Neutrophils Absolute 9 12 Thousands/µL      Immature Grans Absolute 0 05 Thousand/uL      Lymphocytes Absolute 5 72 Thousands/µL      Monocytes Absolute 0 72 Thousand/µL      Eosinophils Absolute 0 08 Thousand/µL      Basophils Absolute 0 09 Thousands/µL              EKG: NSR, no acute ischemia    MRI brain w wo contrast   Final Result by Naseem 6, DO (09/23 1555)      Recent infarcts are seen in the right occipital lobe, and tiny lacunar infarct right medial thalamus  No evidence of hemorrhage  Workstation performed: [id number]         CTA head and neck with and without contrast   Final Result by Samina Spence MD (09/23 0032)      No focal stenosis or saccular aneurysm within the [place]  No hemodynamically significant stenosis within either common or internal carotid artery  Less than 50% stenosis by NASCET criteria  Dominant left vertebral artery  Please refer to the report of a CT brain performed approximately 2 hours prior for additional details  Workstation performed: [id number]         CT head without contrast   Final Result by Samina Spence MD (09/22 3053)      Subcentimeter hyperdense lesion at the posterior right centrum semiovale  The findings are nonspecific with differential considerations including a cavernoma, hemorrhagic lesion, among other etiologies  In addition, there is a 2 cm round area of    low-attenuation at the medial right occipital lobe raising the concern for neoplasm or ischemia  No prior studies are available for comparison  An enhanced MRI of the brain is recommended for further evaluation                  I personally discussed this study with Dr Kat Kaminski on 9/22/2021 at 10:39 PM                            Workstation performed: [id number]         X-ray chest 1 view portable   Final Result by Tessa Steiner MD (09/23 4594)      No acute cardiopulmonary disease  Workstation performed: [id number]                    Procedures  Procedures         ED Course                             SBIRT 22yo+      Most Recent Value   SBIRT (22 yo +)   In order to provide better care to our patients, we are screening all of our patients for alcohol and drug use  Would it be okay to ask you these screening questions? No Filed at: 09/22/2021 2119                    [facility]  Number of Diagnoses or Management Options  Abnormal CT of brain  Acute headache  Brain lesion  Diagnosis management comments: IMP: migraine/tension/cluster headache versus cephalgia, sinus headache, electrolyte abnormality, medication side affect, dehydration  Doubt acs, pe, dissection, tamponade, bacteremia, surgical abd process  Plan: cardiac labs, ekg, cxr, ct head, give ivf and iv migraine specific meds prn   - WBC 15  - ct head pendng  - Pt will be signed out to the oncoming ED physician         Amount and/or Complexity of Data Reviewed  Clinical lab tests: ordered and reviewed  Tests in the radiology section of CPT®: ordered and reviewed  Tests in the medicine section of CPT®: ordered and reviewed  Decide to obtain previous medical records or to obtain history from someone other than the patient: yes  Review and summarize past medical records: yes  Independent visualization of images, tracings, or specimens: yes    Risk of Complications, Morbidity, and/or Mortality  Presenting problems: high  Diagnostic procedures: high  Management options: high    Patient Progress  Patient progress: stable      Disposition  Final diagnoses:   Abnormal CT of brain   Acute headache   Brain lesion     Time reflects when diagnosis was documented in both MDM as applicable and the Disposition within this note     Time User Action Codes Description Comment    9/23/2021  1:03 AM Ronald Palma Add [R90 89] Abnormal CT of brain     9/23/2021  1:03 AM Ronald Palma Add [R51 9] Acute headache     9/23/2021  1:04 AM Leverne Room Add [G93 9] Brain lesion     9/23/2021  5:32 PM Marylu Morgandonn Add [I63 9] Acute CVA (cerebrovascular accident) ([facility] [address] )     9/24/2021 11:53 AM Juanita Joseph Add [I63 9] Cerebrovascular accident (CVA), unspecified mechanism [facility])       ED Disposition     ED Disposition Condition Date/Time Comment    Admit Stable Thu Sep 23, 2021 12:55 AM Case was discussed with VIVIANA and the patient's admission status was agreed to be Admission Status: observation status to the service of Dr Lance Hopson   Follow-up Information     Follow up With Specialties Details Why Contact Info Additional [address] [facility] Endocrinology Follow up Follow up within 1 month with your current endocrinologist or call [facility]  Endocrinology to enquire about availability of endocrinology closer to your home  [phone]  [address], [address] [place] [address],  Neurology Follow up Call [facility] neurology to set up follow up appointment within about 6 weeks of discharge   [facility]             Discharge Medication List as of 9/24/2021 12:46 PM      START taking these medications    Details   aspirin 81 mg chewable tablet Chew 1 tablet (81 mg total) daily, Starting Sat 9/25/2021, Normal      atorvastatin (LIPITOR) 40 mg tablet Take 1 tablet (40 mg total) by mouth every evening, Starting Fri 9/24/2021, Normal         CONTINUE these medications which have CHANGED    Details   lisinopril (ZESTRIL) 10 mg tablet Take 1 tablet (10 mg total) by mouth daily, Starting Fri 9/24/2021, Normal         CONTINUE these medications which have NOT CHANGED    Details   amLODIPine (NORVASC) 2 5 mg tablet Take 2 5 mg by mouth daily, Starting Tue 9/21/2021, Until Wed 9/21/2022, Historical Med      Lantus SoloStar 100 units/mL injection pen INJECT 60 UNITS UNDER THE SKIN DAILY     (REFER TO PRESCRIPTION NOTES)  , Historical Med      metFORMIN (GLUCOPHAGE) 1000 MG tablet Take 1 tablet by mouth daily, Starting Fri 7/30/2021, Historical Med         STOP taking these medications       glimepiride (AMARYL) 2 mg tablet Comments:   Reason for Stopping:         rosuvastatin (CRESTOR) 40 MG tablet Comments:   Reason for Stopping:             Outpatient Discharge Orders   Ambulatory referral to Cardiology   Standing Status: Future Standing Exp   Date: 09/24/22      Discharge Diet     Activity as tolerated       PDMP Review       Value Time User    PDMP Reviewed  Yes 9/23/2021  1:46 AM Radha Toro PA-C          ED Provider  Electronically Signed by           Chadwick Gloria DO  09/30/21 4362

## 2021-09-23 NOTE — ASSESSMENT & PLAN NOTE
WBC level at 15 78  No clear source of infection  Tested for COVID 19 with negative results  Check UA  Check Blood cultures  Check Procalcitonin  Monitor of antibiotic coverage  Follow cultures, Procalcitonin  AM CBC

## 2021-09-23 NOTE — PHYSICAL THERAPY NOTE
Physical Therapy Evaluation    Patient Name: Jesusita Huddleston    SEDQS'N Date: 9/23/2021     Problem List  Principal Problem:    Brain lesion  Active Problems:    Type 2 diabetes mellitus with hyperglycemia, with long-term current use of insulin (HCC)    Headache, unspecified    Hyponatremia    Leukocytosis    Essential hypertension       Past Medical History  Past Medical History:   Diagnosis Date    Diabetes mellitus ([facility] [address] )     Hypertension         Past Surgical History  Past Surgical History:   Procedure Laterality Date    CARDIAC SURGERY             09/23/21 1423   PT Last Visit   PT Visit Date 09/23/21   Note Type   Note type Evaluation   Pain Assessment   Pain Assessment Tool 0-10   Pain Score 3   Pain Location/Orientation Location: Head   Home Living   Type of Home House   Home Layout Two level;Bed/bath upstairs   Bathroom Shower/Tub Tub/shower unit   Bathroom Toilet Standard   Bathroom Accessibility Accessible   Additional Comments pt denies use of DME at baseline   Prior Function   Level of [place] Independent with ADLs and functional mobility   Lives With Spouse   ADL Assistance Independent   IADLs Independent   Falls in the last 6 months 0   Vocational Full time employment   Comments (+) driving   Restrictions/Precautions   Weight Bearing Precautions Per Order No   General   Family/Caregiver Present No   Cognition   Overall Cognitive Status WFL   Arousal/Participation Alert   Orientation Level Oriented X4   Following Commands Follows all commands and directions without difficulty   RLE Assessment   RLE Assessment WFL   LLE Assessment   LLE Assessment WFL   Bed Mobility   Supine to Sit 7  Independent   Sit to Supine 7  Independent   Transfers   Sit to Stand 7  Independent   Stand to Sit 7  Independent   Additional Comments no device used   Ambulation/Elevation   Gait pattern WNL   Gait Assistance 7  Independent   Assistive Device None   Distance 150'   Balance   Static Sitting Good   Dynamic Sitting Good   Static Standing Good   Dynamic Standing Good   Ambulatory Good   Endurance Deficit   Endurance Deficit No   Activity Tolerance   Activity Tolerance Patient tolerated treatment well   Assessment   Prognosis Good   Assessment Patient is a 60 y o  male evaluated by Physical Therapy s/p admit to [address] Unit on 9/22/2021 with admitting diagnosis of: Headache and principal problem of: Brain lesion  PT was consulted to assess patient's functional mobility and discharge needs  Ordered are PT Evaluation and treatment with activity level of: up and OOB as tolerated  Upon evaluation, pt able to perform all functional mobility independently without assistive device  Pt demonstrating WFL balance, strength, and endurance  Pt's only complaint at this time is a mild headache which does worsen with ambulation  Continued PT intervention in the acute setting not indicated; will d/c PT orders  Co treatment with OT secondary to complex medical condition of pt, possible A of 2 required to achieve and maintain transitional movements, requiring the need of skilled therapeutic intervention of 2 therapists to achieve delivery of services  Goals   Patient Goals to go home   Plan   PT Frequency One time visit   Recommendation   PT Discharge Recommendation No rehabilitation needs   PT - OK to Discharge Yes   AM-PAC Basic Mobility Inpatient   Turning in Bed Without Bedrails 4   Lying on Back to Sitting on Edge of Flat Bed 4   Moving Bed to Chair 4   Standing Up From Chair 4   Walk in Room 4   Climb 3-5 Stairs 4   Basic Mobility Inpatient Raw Score 24   Basic Mobility Standardized Score 57 68     Pt supine in bed at end of session with all needs in reach

## 2021-09-23 NOTE — PLAN OF CARE
Problem: NEUROSENSORY - ADULT  Goal: Achieves stable or improved neurological status  Description: INTERVENTIONS  - Monitor and report changes in neurological status  - Monitor vital signs such as temperature, blood pressure, glucose, and any other labs ordered   - Initiate measures to prevent increased intracranial pressure  - Monitor for seizure activity and implement precautions if appropriate      Outcome: Progressing     Problem: PAIN - ADULT  Goal: Verbalizes/displays adequate comfort level or baseline comfort level  Description: Interventions:  - Encourage patient to monitor pain and request assistance  - Assess pain using appropriate pain scale  - Administer analgesics based on type and severity of pain and evaluate response  - Implement non-pharmacological measures as appropriate and evaluate response  - Consider cultural and social influences on pain and pain management  - Notify physician/advanced practitioner if interventions unsuccessful or patient reports new pain  Outcome: Progressing     Problem: DISCHARGE PLANNING  Goal: Discharge to home or other facility with appropriate resources  Description: INTERVENTIONS:  - Identify barriers to discharge w/patient and caregiver  - Arrange for needed discharge resources and transportation as appropriate  - Identify discharge learning needs (meds, wound care, etc )  - Arrange for interpretive services to assist at discharge as needed  - Refer to Case Management Department for coordinating discharge planning if the patient needs post-hospital services based on physician/advanced practitioner order or complex needs related to functional status, cognitive ability, or social support system  Outcome: Progressing     Problem: Knowledge Deficit  Goal: Patient/family/caregiver demonstrates understanding of disease process, treatment plan, medications, and discharge instructions  Description: Complete learning assessment and assess knowledge base    Interventions:  - Provide teaching at level of understanding  - Provide teaching via preferred learning methods  Outcome: Progressing

## 2021-09-23 NOTE — TELEMEDICINE
TeleConsultation - Neurology   Radha España 60 y o  male MRN: [id number]  Unit/Bed#: RM06 Encounter: [id number]        REQUIRED DOCUMENTATION:     1  This service was provided via Telemedicine  2  Provider located at [facility]  3  TeleMed provider: Brenton An DO   4  Identify all parties in room with patient during tele consult:   patient  5  Patient was then informed that this was a Telemedicine visit and that the exam was being conducted confidentially over secure lines  My office door was closed  No one else was in the room  Patient acknowledged consent and understanding of privacy and security of the Telemedicine visit, and gave us permission to have the assistant stay in the room in order to assist with the history and to conduct the exam   I informed the patient that I have reviewed their record in Epic and presented the opportunity for them to ask any questions regarding the visit today  The patient agreed to participate  Assessment/Plan   49-year-old male with  Right PCA territory stroke,   Currently cryptogenic      - MRI brain demonstrates ischemia in the right occipital region and thalamus ( punctate)  -  Continue aspirin 81 mg daily     -Recommend starting Plavix 75 mg daily, and continuing dual antiplatelet therapy for 21 days then discontinuing Plavix and continuing aspirin monotherapy  -   CTA head and neck was without any gross hemodynamically significant stenosis  -   Echocardiogram was unremarkable and demonstrated EF 60%, with no gross dyskinesis or atrial enlargement  - A1c elevated at 10 8  -FLP with elevated total cholesterol and LDL, 247 and 179 respectively  -   Continue Lipitor 40 mg daily  -    Recommend loop recorder placement, this can be done as an outpatient   -   No additional neurologic recommendations at this time     -Okay to discharge from neurologic standpoint     - Office will call patient to arrange outpatient follow-up      - please call questions /concerns      Headache, unspecified  Assessment & Plan  - CT head noting R centrum semiovale hyperintensity and hypo-attenuation in R occipital region  - CTA head/neck with no significant vascular abnormality/flow restrictive disease  - obtain MRI brain with and without contrast   - status post Reglan/Ativan for headache, PRN Zofran ordered  - supportive care    Leukocytosis  Assessment & Plan  -15K  -infectious workup ongoing:  -UA unrevealing, COVID negative  -blood cultures pending, checking procalcitonin    Type 2 diabetes mellitus with hyperglycemia, with long-term current use of insulin [facility])  Assessment & Plan  Lab Results   Component Value Date    HGBA1C 11 1 (H) 12/10/2018       Recent Labs     09/22/21  1858 09/23/21  0644   POCGLU 224* 235*       Blood Sugar Average: Last 72 hrs:  (P) 235     -updated hemoglobin A1c pending  -euglycemic goals, management per primary team        Shanice Verde will need follow up in in 6 weeks with neurovascular attending or advance practitioner  He will not require outpatient neurological testing  History of Present Illness     Reason for Consult / Principal Problem: Headache, vision disturbances, nausea, abnormal CT head    HPI: Shanice Verde is a 60 y o   male with history as mentioned above in assessment who neurology is asked to evaluate in regards to headache, vision disturbance, nausea, and abnormal CT findings  History primarily via chart review and discussion with patient:    Given the symptoms he presented to the ED yesterday for evaluation  Initial workup included a febrile state, some hypertension with systolic BP ranging from 514'E to 180's, labs noting hyponatremia 132, elevated glucose, otherwise no gross metabolic derangements, negative troponin  Does have white count elevation at 15K, UA negative, COVID negative  TSH within normal limits  Patient had CT head as well as CTA head/neck as mentioned above      Last night/overnight received IV fluids, Reglan/Ativan  Inpatient consult to Neurology  Consult performed by: Phuc Louise PA-C  Consult ordered by: Jacinda Monge PA-C           Review of Systems   Constitutional: Negative  HENT: Negative for hearing loss  Eyes: Negative for photophobia and visual disturbance  Respiratory: Negative for wheezing  Cardiovascular: Negative for chest pain and palpitations  Genitourinary: Negative for dysuria and urgency  Neurological: Negative for dizziness, weakness, light-headedness, numbness and headaches  All other systems reviewed and are negative  Historical Information   Past Medical History:   Diagnosis Date    Diabetes mellitus ([facility] [address] )     Hypertension      Past Surgical History:   Procedure Laterality Date    CARDIAC SURGERY       Social History   Social History     Substance and Sexual Activity   Alcohol Use Yes    Comment: "a couple of beer per day"     Social History     Substance and Sexual Activity   Drug Use Never     E-Cigarette/Vaping    E-Cigarette Use Never User      E-Cigarette/Vaping Substances     Social History     Tobacco Use   Smoking Status Never Smoker   Smokeless Tobacco Never Used     Family History: History reviewed  No pertinent family history  Review of previous medical records was completed      Meds/Allergies   current meds:   Current Facility-Administered Medications   Medication Dose Route Frequency    acetaminophen (TYLENOL) tablet 650 mg  650 mg Oral Q6H PRN    amLODIPine (NORVASC) tablet 2 5 mg  2 5 mg Oral Daily    insulin glargine (LANTUS) subcutaneous injection 60 Units 0 6 mL  60 Units Subcutaneous QAM    insulin lispro (HumaLOG) 100 units/mL subcutaneous injection 1-6 Units  1-6 Units Subcutaneous TID AC    insulin lispro (HumaLOG) 100 units/mL subcutaneous injection 1-6 Units  1-6 Units Subcutaneous HS    ondansetron (ZOFRAN) injection 4 mg  4 mg Intravenous Q6H PRN    sodium chloride (PF) 0 9 % injection 3 mL  3 mL Intravenous Q1H PRN and PTA meds:   Prior to Admission Medications   Prescriptions Last Dose Informant Patient Reported? Taking? Lantus SoloStar 100 units/mL injection pen 9/21/2021  Yes No   Sig: INJECT 60 UNITS UNDER THE SKIN DAILY     (REFER TO PRESCRIPTION NOTES)  amLODIPine (NORVASC) 2 5 mg tablet Not Taking at Unknown time  Yes No   Sig: Take 2 5 mg by mouth daily   Patient not taking: Reported on 9/23/2021   glimepiride (AMARYL) 2 mg tablet   Yes No   Sig: Take 2 mg by mouth daily   Patient not taking: Reported on 9/22/2021   lisinopril (ZESTRIL) 10 mg tablet   Yes No   Sig: Take 10 mg by mouth   Patient not taking: Reported on 9/22/2021   metFORMIN (GLUCOPHAGE) 1000 MG tablet 9/21/2021  Yes No   Sig: Take 1 tablet by mouth daily   rosuvastatin (CRESTOR) 40 MG tablet   Yes No   Sig: Take 40 mg by mouth daily   Patient not taking: Reported on 9/22/2021      Facility-Administered Medications: None       No Known Allergies    Objective   Vitals:Blood pressure 153/83, pulse 73, temperature 99 °F (37 2 °C), temperature source Tympanic, resp  rate 18, height 5' 7" (1 702 m), weight 86 7 kg (191 lb 2 2 oz), SpO2 97 %  ,Body mass index is 29 94 kg/m²  No intake or output data in the 24 hours ending 09/23/21 0811    Invasive Devices: Invasive Devices     Peripheral Intravenous Line            Peripheral IV 09/22/21 Right Antecubital <1 day                Physical Exam  Vitals reviewed  Constitutional:       General: He is not in acute distress  Appearance: Normal appearance  He is well-developed and normal weight  He is not ill-appearing, toxic-appearing or diaphoretic  HENT:      Head: Normocephalic and atraumatic  Eyes:      General: No scleral icterus  Right eye: No discharge  Left eye: No discharge  Extraocular Movements: Extraocular movements intact        Conjunctiva/sclera: Conjunctivae normal    Pulmonary:      Effort: Pulmonary effort is normal  No respiratory distress  Musculoskeletal:         General: Normal range of motion  Skin:     Coloration: Skin is not jaundiced or pale  Neurological:      Mental Status: He is alert  Psychiatric:         Mood and Affect: Mood normal          Behavior: Behavior normal        Neurologic Exam     Mental Status   Awake alert oriented x3  Attention appears normal and intact  Language is fluent, comprehension appears to be intact  No gross evidence of aphasia  Patient is interacting appropriately  Cranial Nerves   Pupils are equal and round  Extraocular muscles intact, gaze conjugate  Face appears symmetric with respect to motor  Tongue is midline  Shoulder shrug is symmetric  Motor Exam Patient moves all 4 extremities equally without drift  Bulk appears normal     Sensory Exam   Patient acknowledges sensation equally in all 4 extremities     Gait, Coordination, and Reflexes  no gross ataxia in any limb  Finger-to-nose was Intact grossly without any clear dysmetria, ataxia, or tremor  Movement was smooth there on the right than left but this could be explained by   Patient's right handedness    Gait was deferred       Lab Results:   CBC:   Results from last 7 days   Lab Units 09/23/21  0621 09/22/21  2136   WBC Thousand/uL 15 74* 15 78*   RBC Million/uL 4 24 4 82   HEMOGLOBIN g/dL 13 3 15 1   HEMATOCRIT % 39 8 45 0   MCV fL 94 93   PLATELETS Thousands/uL 234 265   , BMP/CMP:   Results from last 7 days   Lab Units 09/23/21  0621 09/22/21  2136   SODIUM mmol/L 137 132*   POTASSIUM mmol/L 3 7 4 1   CHLORIDE mmol/L 104 99*   CO2 mmol/L 28 28   BUN mg/dL 14 14   CREATININE mg/dL 0 92 0 98   CALCIUM mg/dL 7 8* 8 4   AST U/L 9 12   ALT U/L 26 32   ALK PHOS U/L 45* 62   EGFR ml/min/1 73sq m 91 85   , Vitamin B12:   , HgBA1C:   , TSH:   Results from last 7 days   Lab Units 09/23/21  0245   TSH 3RD GENERATON uIU/mL 2 265   , Coagulation:   , Lipid Profile:   , Ammonia:   , Urinalysis:   Results from last 7 days   Lab Units 09/23/21  0245   COLOR UA  Yellow   CLARITY UA  Clear   SPEC GRAV UA  1 015   PH UA  6 0   LEUKOCYTES UA  Elevated glucose may cause decreased leukocyte values  See urine microscopic for [facility] result/*   NITRITE UA  Negative   GLUCOSE UA mg/dl >=1000 (1%)*   KETONES UA mg/dl 40 (2+)*   BILIRUBIN UA  Negative   BLOOD UA  Negative   , Drug Screen:   , Medication Drug Levels:       Invalid input(s): CARBAMAZEPINE,  PHENOBARB, LACOSAMIDE, OXCARBAZEPINE  Imaging Studies: I have personally reviewed pertinent films in PACS   CTA head and neck with and without contrast   Final Result by Brianda Guzman MD (09/23 0032)      No focal stenosis or saccular aneurysm within the [place]  No hemodynamically significant stenosis within either common or internal carotid artery  Less than 50% stenosis by NASCET criteria  Dominant left vertebral artery  Please refer to the report of a CT brain performed approximately 2 hours prior for additional details  Workstation performed: [id number]         CT head without contrast   Final Result by Brianda Guzman MD (09/22 8752)      Subcentimeter hyperdense lesion at the posterior right centrum semiovale  The findings are nonspecific with differential considerations including a cavernoma, hemorrhagic lesion, among other etiologies  In addition, there is a 2 cm round area of    low-attenuation at the medial right occipital lobe raising the concern for neoplasm or ischemia  No prior studies are available for comparison  An enhanced MRI of the brain is recommended for further evaluation  I personally discussed this study with Dr Delma Castro on 9/22/2021 at 10:39 PM                            Workstation performed: [id number]         X-ray chest 1 view portable    (Results Pending)   MRI brain w wo contrast    (Results Pending)       EKG, Pathology, and Other Studies: I have personally reviewed pertinent reports         VTE Prophylaxis: Sequential compression device (Venodyne)     Code Status: Level 1 - Full Code

## 2021-09-23 NOTE — H&P
[address] [facility] [address]  H&P- [facility] 1965, 60 y o  male MRN: [id number]  Unit/Bed#: RM06 Encounter: [id number]  Primary Care Provider: No primary care provider on file  Date and time admitted to hospital: 9/22/2021  8:31 PM    * Brain lesion  Assessment & Plan  He presented to the ER with complaints of headache  Associated with nausea, vomiting and visual disturbances  CT head Shows-Subcentimeter hyperdense lesion at the posterior right centrum semiovale   The findings are nonspecific with differential considerations including a cavernoma, hemorrhagic lesion, among other etiologies  In addition, there is a 2 cm round area of low-attenuation at the medial right occipital lobe raising the concern for neoplasm or ischemia   No prior studies are available for comparison   An enhanced MRI of the brain is recommended for further evaluation  ER attending discussed case with neurology   Recommendation given to admit with MRI in the AM  Admit on observation  Telemetry monitoring  Neuro checks  Check MRI  Monitor for worsening headache  Monitor vital signs closely  OT, PT eval  Neurology consult  Am labs  Supportive care     Essential hypertension  Assessment & Plan  Blood pressure is elevated  He is prescribed Amlodipine but states that he did not take it  Will continue with PTA amlodipine  Monitor blood pressure closely   Continue PCP follow up    Leukocytosis  Assessment & Plan  WBC level at 15 78  No clear source of infection  Tested for COVID 19 with negative results  Check UA  Check Blood cultures  Check Procalcitonin  Monitor of antibiotic coverage  Follow cultures, Procalcitonin  AM CBC    Hyponatremia  Assessment & Plan  Sodium level at 132   Corrected to 134  He received normal saline boluses in the ER  Check Osmolality Urine, Sodium Urine random  Monitor sodium levels  Check AM CMP    Headache, unspecified  Assessment & Plan  Presented to the ER for evaluation  of headache X 1 day Associated with Nausea, visual disturbances  CT head Shows-Subcentimeter hyperdense lesion at the posterior right centrum semiovale   The findings are nonspecific with differential considerations including a cavernoma, hemorrhagic lesion, among other etiologies  In addition, there is a 2 cm round area of low-attenuation at the medial right occipital lobe raising the concern for neoplasm or ischemia   No prior studies are available for comparison   An enhanced MRI of the brain is recommended for further evaluation  He received normal saline 2 L,Reglan 10 mg IV in the ER and Ativan 2 mg IV  ER attending discussed case with Neurology  PRN tylenol  Zofran PRN  See plan for brain lesions      Type 2 diabetes mellitus with hyperglycemia, with long-term current use of insulin ([facility] [address] )  Assessment & Plan  Lab Results   Component Value Date    HGBA1C 11 1 (H) 12/10/2018     Blood glucose level at 256  Continue PTA insulin regimen  Sliding scale insulin  Fingerstick glucose 4 times daily AC/HS  Check A1C  Am CMP      VTE Prophylaxis: Low risk after VTE screen  / reason for no mechanical VTE prophylaxis Low risk after  VTE screen   Code Status: Level 1- Full code  POLST: POLST form is not discussed and not completed at this time  Discussion with family: None     Anticipated Length of Stay:  Patient will be admitted on an Observation basis with an anticipated length of stay of  < 2 midnights  Justification for Hospital Stay: Headache, Hyponatremia, Brain lesion noted on CT, Leukocytosis    Total Time for Visit, including Counseling / Coordination of Care: 30 minutes  Greater than 50% of this total time spent on direct patient counseling and coordination of care      Chief Complaint:   Headache    History of Present Illness:    Esperanza Prado is a 60 y o  male who presents to the ER with complaints of Headaches starting yesterday morning getting progressively worst  Headache is associated with visual disturbances, dizziness, nausea and vomiting  He denies  chest pain, SOB, cough, fever, neck or back pain, diarrhea, abdominal pain urinary symptoms  Labs completed in the ER with results as shown below  CT head and CTA head and neck completed with results as shown below  ER attending discussed CT findings with Neurology  Recommendations given to Admit with MRI in the Am  At bedside patient is awake and alert, no acute distress  He complains of headache and nausea  Patient is being admitted on Observation status [facility] level care for management of Headache, Brain lesion, Hyponatremia, Leukocytosis    Review of Systems:    Review of Systems   Constitutional: Positive for chills  Negative for appetite change, diaphoresis and fatigue  HENT: Negative for congestion  Eyes: Positive for visual disturbance  Negative for photophobia  Respiratory: Negative for cough, chest tightness, shortness of breath and wheezing  Cardiovascular: Negative for chest pain, palpitations and leg swelling  Gastrointestinal: Positive for nausea and vomiting  Negative for abdominal pain and diarrhea  Endocrine: Negative for polydipsia, polyphagia and polyuria  Genitourinary: Negative for difficulty urinating, dysuria, frequency and hematuria  Musculoskeletal: Negative for arthralgias, back pain, gait problem and joint swelling  Skin: Negative for color change, pallor, rash and wound  Neurological: Positive for dizziness and headaches  Negative for tremors, seizures, syncope, speech difficulty, weakness and numbness  Psychiatric/Behavioral: Negative for agitation and confusion  The patient is not nervous/anxious  Past Medical and Surgical History:     Past Medical History:   Diagnosis Date    Diabetes mellitus ([facility] [address] )     Hypertension        Past Surgical History:   Procedure Laterality Date    CARDIAC SURGERY         Meds/Allergies:    Prior to Admission medications    Medication Sig Start Date End Date Taking?  Authorizing Provider amLODIPine (NORVASC) 2 5 mg tablet Take 2 5 mg by mouth daily 9/21/21 9/21/22  Historical Provider, MD   glimepiride (AMARYL) 2 mg tablet Take 2 mg by mouth daily  Patient not taking: Reported on 9/22/2021 4/1/20 9/22/21  Historical Provider, MD Letha Macedo 100 units/mL injection pen INJECT 60 UNITS UNDER THE SKIN DAILY     (REFER TO PRESCRIPTION NOTES)  10/8/20   Historical Provider, MD   lisinopril (ZESTRIL) 10 mg tablet Take 10 mg by mouth  Patient not taking: Reported on 9/22/2021    Historical Provider, MD   metFORMIN (GLUCOPHAGE) 1000 MG tablet Take 1 tablet by mouth daily 7/30/21   Historical Provider, MD   rosuvastatin (CRESTOR) 40 MG tablet Take 40 mg by mouth daily  Patient not taking: Reported on 9/22/2021 4/1/20 4/1/21  Historical Provider, MD     I have reviewed home medications with patient personally  Allergies: No Known Allergies    Social History:     Marital Status: /Civil Union   Occupation: Cement   Patient Pre-hospital Living Situation: Lives with his wife  Patient Pre-hospital Level of Mobility: Active  Patient Pre-hospital Diet Restrictions: None reported  Substance Use History:   Social History     Substance and Sexual Activity   Alcohol Use Yes    Comment: "a couple of beer per day"     Social History     Tobacco Use   Smoking Status Never Smoker   Smokeless Tobacco Never Used     Social History     Substance and Sexual Activity   Drug Use Never       Family History:    History reviewed  No pertinent family history  Physical Exam:     Vitals:   Blood Pressure: 127/69 (09/23/21 0400)  Pulse: 79 (09/23/21 0400)  Temperature: 99 °F (37 2 °C) (09/23/21 0100)  Temp Source: Tympanic (09/23/21 0100)  Respirations: 19 (09/23/21 0400)  Height: 5' 7" (170 2 cm) (09/23/21 0100)  Weight - Scale: 86 7 kg (191 lb 2 2 oz) (09/23/21 0100)  SpO2: 95 % (09/23/21 0400)    Physical Exam  Vitals reviewed     Constitutional:       General: He is not in acute distress  Appearance: He is not ill-appearing or diaphoretic  HENT:      Head: Normocephalic and atraumatic  Nose: No congestion or rhinorrhea  Mouth/Throat:      Mouth: Mucous membranes are moist       Pharynx: Oropharynx is clear  Eyes:      General: No scleral icterus  Right eye: No discharge  Left eye: No discharge  Extraocular Movements: Extraocular movements intact  Pupils: Pupils are equal, round, and reactive to light  Cardiovascular:      Rate and Rhythm: Normal rate and regular rhythm  Pulses: Normal pulses  Pulmonary:      Effort: No respiratory distress  Breath sounds: No stridor  No wheezing, rhonchi or rales  Abdominal:      General: There is no distension  Palpations: Abdomen is soft  Tenderness: There is no abdominal tenderness  Musculoskeletal:         General: Normal range of motion  Cervical back: Normal range of motion and neck supple  No rigidity or tenderness  Right lower leg: No edema  Skin:     General: Skin is dry  Capillary Refill: Capillary refill takes less than 2 seconds  Coloration: Skin is not jaundiced or pale  Findings: No bruising, erythema or rash  Neurological:      General: No focal deficit present  Mental Status: He is alert and oriented to person, place, and time  Motor: No weakness  Additional Data:     Lab Results: I have personally reviewed pertinent reports        Results from last 7 days   Lab Units 09/22/21  2136   WBC Thousand/uL 15 78*   HEMOGLOBIN g/dL 15 1   HEMATOCRIT % 45 0   PLATELETS Thousands/uL 265   NEUTROS PCT % 57   LYMPHS PCT % 36   MONOS PCT % 5   EOS PCT % 1     Results from last 7 days   Lab Units 09/22/21  2136   SODIUM mmol/L 132*   POTASSIUM mmol/L 4 1   CHLORIDE mmol/L 99*   CO2 mmol/L 28   BUN mg/dL 14   CREATININE mg/dL 0 98   ANION GAP mmol/L 5   CALCIUM mg/dL 8 4   ALBUMIN g/dL 3 6   TOTAL BILIRUBIN mg/dL 0 56   ALK PHOS U/L 62   ALT U/L 32   AST U/L 12   GLUCOSE RANDOM mg/dL 256*         Results from last 7 days   Lab Units 09/22/21  1858   POC GLUCOSE mg/dl 224*               Imaging: I have personally reviewed pertinent reports  CTA head and neck with and without contrast   Final Result by Cayetano Ross MD (09/23 0032)      No focal stenosis or saccular aneurysm within the [place]  No hemodynamically significant stenosis within either common or internal carotid artery  Less than 50% stenosis by NASCET criteria  Dominant left vertebral artery  Please refer to the report of a CT brain performed approximately 2 hours prior for additional details  Workstation performed: [id number]         CT head without contrast   Final Result by Cayetano Ross MD (09/22 2243)      Subcentimeter hyperdense lesion at the posterior right centrum semiovale  The findings are nonspecific with differential considerations including a cavernoma, hemorrhagic lesion, among other etiologies  In addition, there is a 2 cm round area of    low-attenuation at the medial right occipital lobe raising the concern for neoplasm or ischemia  No prior studies are available for comparison  An enhanced MRI of the brain is recommended for further evaluation  I personally discussed this study with Dr Arnold Mcdowell on 9/22/2021 at 10:39 PM                            Workstation performed: [id number]         X-ray chest 1 view portable    (Results Pending)   MRI inpatient order    (Results Pending)       EKG, Pathology, and Other Studies Reviewed on Admission:   · EKG: Normal Sinus rhythm at rate of 77 bpm    Allscripts / Epic Records Reviewed: Yes     ** Please Note: This note has been constructed using a voice recognition system   **

## 2021-09-23 NOTE — ASSESSMENT & PLAN NOTE
-15K  -infectious workup ongoing:  -UA unrevealing, COVID negative  -blood cultures pending, checking procalcitonin

## 2021-09-23 NOTE — ASSESSMENT & PLAN NOTE
· He presented to the ER with complaints of headache  Associated with nausea, vomiting and visual disturbances  · CT head had abnormal findings - Subcentimeter hyperdense lesion at the posterior right centrum semiovale   The findings are nonspecific with differential considerations including a cavernoma, hemorrhagic lesion, among other etiologies  In addition, there is a 2 cm round area of low-attenuation at the medial right occipital lobe raising the concern for neoplasm or ischemia   No prior studies are available for comparison   An enhanced MRI of the brain is recommended for further evaluation  · ER attending discussed case with neurology  Recommendation given to admit with follow up MRI for further detail  · MRI brain 9/23/21 -  Recent infarcts are seen in the right occipital lobe, and tiny lacunar infarct right medial thalamus  No evidence of hemorrhage  · Further stroke workup / treatment:  · Stroke protocol followed with frequent VS, neuro checks  · Telemetry monitoring: stable,  No signs of atrial fibrillation  · Echocardiogram - unremarkable, EF 63%, mild diastolic dysfunction noted  · Anti-platelet: Patient received aspirin 325mg x 1 dose, to be continued at 81mg daily while here  · Hypertension: Permissive hypertension allowed initially  Will need to consider adjustment of anti-hypertensive regimen as needed  · Hyperlipidemia: LDL of 179 noted  Patient was started on Lipitor 40mg daily during this hospitalization  · Uncontrolled DM2: patient has dietary noncompliance  Will need to continue current insulin regimen, keep a food diary and strict BG diary with further outpatient adjustments as needed  · OT, PT evaluation - no need for PT/OT services, safe discharge home  · D/w Neurology  Will plan for outpatient follow up in 6 weeks

## 2021-09-23 NOTE — ASSESSMENT & PLAN NOTE
Blood pressure is elevated  He is prescribed Amlodipine but states that he did not take it  Will continue with PTA amlodipine  Monitor blood pressure closely   Continue PCP follow up

## 2021-09-23 NOTE — UTILIZATION REVIEW
Initial Clinical Review    Admission: Date/Time/Statement:       OBSERVATION 9-23 -21 CHANGED TO INPATIENT 9-23-21 FOR CONTINUED EVALUATION AND TREATMENT OF MRI SHOWING  STROKE    09/23/21 1732  Inpatient Admission Once     Transfer Service: Hospitalist       Question Answer   Level of Care Med Surg   Estimated length of stay More than 2 Midnights   Certification I certify that inpatient services are medically necessary for this patient for a duration of greater than two midnights  See H&P and MD Progress Notes for additional information about the patient's course of treatment  Admission Orders (From admission, onward)     Ordered        09/23/21 0055  Place in Observation  Once                     ED Arrival Information     Expected Arrival Acuity    - 9/22/2021 20:29 Urgent         Means of arrival Escorted by Service Admission type    Walk-In Family Member Hospitalist Urgent         Arrival complaint    Headache        Chief Complaint   Patient presents with    Headache     x2 day with tunnel vision  reports nausea and light sensitivity  tylenol last 9/21  denies head injury       Initial Presentation:     60year old male presents to ed from home for evaluation and treatment of headache, light sensitivity,  tunnel vision, nausea  Clinical assessment significant for hypertension, headache 7/10,  Abnormal Ct head shows a hyperdense lesion at the posterior right centrum semiovale  Treated in ed with iv ns bolus, iv reglan, iv ativan, iv labetalol, norvasc  Admit to observation for brain lesion  Plan includes:  Neuro check q4 hr, PT/OT evaluations, consult neurology, telemetry, Mri brain  Reason for not initiating IV thrombolytic: Patient refused  Date:  9-23-21   Day 2: observation      PT/OT evaluations  Without rehab needs  GCS=15   MRI result indicates recent infarcts in right occipital lobe and lacunar infarct right medial thalamus    Plan includes telemetry, Echo, neuro checks, neurology consult, aspirin, lovenox, lipitor, permissive hypertension, dysphagia assessment       ED Triage Vitals [09/22/21 2037]   98 1 °F (36 7 °C) 78 18 (!) 174/90 98 %      Temporal Monitor         Pain Score       7          09/23/21 86 7 kg (191 lb 2 2 oz)     Additional Vital Signs:     Date/Time  Temp  Pulse  Resp  BP  MAP   SpO2  O2 Device   09/23/21 1141  --  83  18  185/104  Abnormal   --  95 %  None (Room air)   09/23/21 0923  --  --  --  170/87  --  --  --   09/23/21 0730  --  73  18  153/83  --  97 %  None (Room air)   09/23/21 0645  --  71  20  163/87  117  96 %  None (Room air)   09/23/21 0630  --  79  23  Abnormal   153/87  112  96 %  --   09/23/21 0615  --  76  22  161/84  113  96 %  None (Room air)   09/23/21 0400  --  79  19  127/69  92  95 %  None (Room air)   09/23/21 0345  --  84  21  139/65  94  95 %  --   09/23/21 0330  --  95  20  135/64  84  94 %  --   09/23/21 0300  --  75  --  --  --  --  --   09/23/21 0200  --  77  26  Abnormal   174/84  Abnormal   121  95 %  None (Room air)   09/23/21 0100  99 °F (37 2 °C)  71  22  182/86  Abnormal   124  98 %  None (Room air)   09/23/21 0030  --  67  20  149/77  104  94 %  --   09/23/21 0000  --  76  19  165/95  123  95 %  None (Room air)   09/22/21 2345  --  81  22  171/93  Abnormal   125  97 %  None (Room air)   09/22/21 2300  --  83  --  154/78  109  93 %  --   09/22/21 2230  --  88  16  140/65  93  95 %  None (Room air)   09/22/21 2118  --  --  --  --  --  --  None (Room air)   09/22/21 2100  --  78  16  169/85  120  95 %  None (Room air)           Date and Time Eye Opening Best Verbal Response Best Motor Response [place] Coma Scale Score   09/23/21 0730 4 5 6 15   09/23/21 0330 4 5 6 15   09/23/21 0145 4 5 6 15   09/22/21 2117 4 5 6 15           Pertinent Labs/Diagnostic Test Results:         Collection Time Result Time Vent R Atrial R MS Int  QRSD Int  QT Int  QTC Int   P Axis QRS Axis T Wave Ax    09/22/21 21:27:25 09/23/21 08:33:24 77 77 150 80 374 423 69 16 36       Final result                Narrative:    Normal sinus rhythm   Possible Left atrial enlargement   Nonspecific T wave abnormality   Abnormal ECG   When compared with ECG of 27-OCT-2020 17:50,   Nonspecific T wave abnormality, worse in Lateral leads                MRI BRAIN WITH AND WITHOUT CONTRAST   9-23-21   INDICATION: Headache, Lesion noted on CT  IMPRESSION:     Recent infarcts are seen in the right occipital lobe, and tiny lacunar infarct right medial thalamus  No evidence of hemorrhage  CTA head and neck with and without contrast   Final R (09/23 0032)      No focal stenosis or saccular aneurysm within the [place]  No hemodynamically significant stenosis within either common or internal carotid artery  Less than 50% stenosis by NASCET criteria  Dominant left vertebral artery  CT head without contrast   Final  (09/22 2243)      Subcentimeter hyperdense lesion at the posterior right centrum semiovale  The findings are nonspecific with differential considerations including a cavernoma, hemorrhagic lesion, among other etiologies  In addition, there is a 2 cm round area of    low-attenuation at the medial right occipital lobe raising the concern for neoplasm or ischemia  No prior studies are available for comparison  An enhanced MRI of the brain is recommended for further evaluation  X-ray chest 1 view portable   Final (09/23 0851)      No acute cardiopulmonary disease          Results from last 7 days   Lab Units 09/23/21  0132   SARS-COV-2  Negative     Results from last 7 days   Lab Units 09/23/21  0621 09/22/21  2136   WBC Thousand/uL 15 74* 15 78*   HEMOGLOBIN g/dL 13 3 15 1   HEMATOCRIT % 39 8 45 0   PLATELETS Thousands/uL 234 265   NEUTROS ABS Thousands/µL  --  9 12*         Results from last 7 days   Lab Units 09/23/21  0621 09/22/21  2136   SODIUM mmol/L 137 132*   POTASSIUM mmol/L 3 7 4 1   CHLORIDE mmol/L 104 99*   CO2 mmol/L 28 28 ANION GAP mmol/L 5 5   BUN mg/dL 14 14   CREATININE mg/dL 0 92 0 98   EGFR ml/min/1 73sq m 91 85   CALCIUM mg/dL 7 8* 8 4   MAGNESIUM mg/dL 2 0 2 0   PHOSPHORUS mg/dL 3 2  --      Results from last 7 days   Lab Units 09/23/21  0621 09/22/21  2136   AST U/L 9 12   ALT U/L 26 32   ALK PHOS U/L 45* 62   TOTAL PROTEIN g/dL 6 0* 7 2   ALBUMIN g/dL 3 0* 3 6   TOTAL BILIRUBIN mg/dL 0 49 0 56     Results from last 7 days   Lab Units 09/23/21  1135 09/23/21  0644 09/22/21  1858   POC GLUCOSE mg/dl 243* 235* 224*     Results from last 7 days   Lab Units 09/23/21  0621 09/22/21  2136   GLUCOSE RANDOM mg/dL 277* 256*         Results from last 7 days   Lab Units 09/23/21  0245   HEMOGLOBIN A1C % 10 8*   EAG mg/dl 263       Results from last 7 days   Lab Units 09/22/21  2136   TROPONIN I ng/mL <0 02             Results from last 7 days   Lab Units 09/23/21  0245   TSH 3RD GENERATON uIU/mL 2 265     Results from last 7 days   Lab Units 09/23/21  6430   PROCALCITONIN ng/ml <0 05     Results from last 7 days   Lab Units 09/22/21  2136   LIPASE u/L 72*             Results from last 7 days   Lab Units 09/23/21  0246 09/23/21  0245   CLARITY UA   --  Clear   COLOR UA   --  Yellow   SPEC GRAV UA   --  1 015   PH UA   --  6 0   GLUCOSE UA mg/dl  --  >=1000 (1%)*   KETONES UA mg/dl  --  40 (2+)*   BLOOD UA   --  Negative   PROTEIN UA mg/dl  --  Negative   NITRITE UA   --  Negative   BILIRUBIN UA   --  Negative   UROBILINOGEN UA E U /dl  --  0 2   LEUKOCYTES UA   --  Elevated glucose may cause decreased leukocyte values  See urine microscopic for [facility] result/*   WBC UA /hpf  --  None Seen   RBC UA /hpf  --  None Seen   BACTERIA UA /hpf  --  Occasional   EPITHELIAL CELLS WET PREP /hpf  --  None Seen   MUCUS THREADS   --  Occasional*   SODIUM UR  122  --      Results from last 7 days   Lab Units 09/23/21  0245   BLOOD CULTURE  Received in Microbiology Lab  Culture in Progress  Received in Microbiology Lab  Culture in Progress  ED Treatment:   Medication Administration from 09/22/2021 2029 to 09/23/2021 1455       Date/Time Order Dose Route Action     09/22/2021 2141 sodium chloride 0 9 % bolus 1,000 mL 1,000 mL Intravenous New Bag     09/22/2021 2135 sodium chloride 0 9 % bolus 1,000 mL 1,000 mL Intravenous New Bag     09/22/2021 2136 metoclopramide (REGLAN) injection 10 mg 10 mg Intravenous Given     09/22/2021 2136 LORazepam (ATIVAN) injection 2 mg 2 mg Intravenous Given     09/23/2021 0005 iohexol (OMNIPAQUE) 350 MG/ML injection (SINGLE-DOSE) 85 mL 85 mL Intravenous Given     09/23/2021 0923 amLODIPine (NORVASC) tablet 2 5 mg 2 5 mg Oral Given     09/23/2021 8566 insulin glargine (LANTUS) subcutaneous injection 60 Units 0 6 mL 60 Units Subcutaneous Given     09/23/2021 1138 insulin lispro (HumaLOG) 100 units/mL subcutaneous injection 1-6 Units 3 Units Subcutaneous Given     09/23/2021 0713 insulin lispro (HumaLOG) 100 units/mL subcutaneous injection 1-6 Units 3 Units Subcutaneous Given     09/23/2021 0340 Labetalol HCl (NORMODYNE) injection 5 mg 5 mg Intravenous Given        Past Medical History:   Diagnosis Date    Diabetes mellitus ([facility] [address] )     Hypertension      Present on Admission:   Headache, unspecified      Admitting Diagnosis: Headache [R51 9]  Age/Sex: 60 y o  male    Scheduled Medications:  amLODIPine, 2 5 mg, Oral, Daily  insulin glargine, 60 Units, Subcutaneous, QAM  insulin lispro, 1-6 Units, Subcutaneous, TID AC  insulin lispro, 1-6 Units, Subcutaneous, HS      Continuous IV Infusions:     PRN Meds:  acetaminophen, 650 mg, Oral, Q6H PRN  ondansetron, 4 mg, Intravenous, Q6H PRN  sodium chloride (PF), 3 mL, Intravenous, Q1H PRN        IP CONSULT TO NEUROLOGY  IP CONSULT TO CASE MANAGEMENT    Network Utilization Review Department  ATTENTION: Please call with any questions or concerns to [phone] and carefully listen to the prompts so that you are directed to the right person   All voicemails are confidential   Florin Cole all requests for admission clinical reviews, approved or denied determinations and any other requests to dedicated fax number below belonging to the campus where the patient is receiving treatment   List of dedicated fax numbers for the Facilities:  [address] DENIALS (Administrative/Medical Necessity) [phone]   [address] (Maternity/NICU/Pediatrics) [phone] [address] [facility] [place] [id number] [address] Leighton Gipson 14[address] [facility] [place] [address] [phone]

## 2021-09-23 NOTE — ASSESSMENT & PLAN NOTE
Lab Results   Component Value Date    HGBA1C 11 1 (H) 12/10/2018     Blood glucose level at 256  Continue PTA insulin regimen  Sliding scale insulin  Fingerstick glucose 4 times daily AC/HS  Check A1C  Am CMP

## 2021-09-23 NOTE — QUICK NOTE
Chart reviewed  MRI results reviewed - new acute stroke R occipital and R thalamus  Will place on acute stroke pathway including ASA, lipitor, telemetry, echocardiogram, frequent VS monitoring and neuro checks  Neurology consult pending  Allow for permissive HTN in the setting of acute stroke, but will require tighter BP control, DM control on discharge

## 2021-09-23 NOTE — ED NOTES
Pt aware of need for urine sample  Unable to provide one at this time         Rodriguez Avina, GLENNY  09/22/21 5778

## 2021-09-23 NOTE — ASSESSMENT & PLAN NOTE
Lab Results   Component Value Date    HGBA1C 11 1 (H) 12/10/2018       Recent Labs     09/22/21  1858 09/23/21  0644   POCGLU 224* 235*       Blood Sugar Average: Last 72 hrs:  (P) 235     -updated hemoglobin A1c pending  -euglycemic goals, management per primary team

## 2021-09-23 NOTE — ASSESSMENT & PLAN NOTE
Lab Results   Component Value Date    HGBA1C 10 8 (H) 09/23/2021     Lantus prescribed at 70 units daily per endocrinology  Patient admits he adjusts Lantus day to day based on his blood glucose levels  Utilized Fingerstick glucose 4 times daily AC/HS, Sliding scale insulin PRN  Fasting BG was well controlled here while observed on diabetic diet  Suspect dietary noncompliance  Will need to keep a food diary, blood glucose diary to present to his PCP at his follow up visit  He needs close outpatient follow up with endocrinologist as well  Would benefit from formal dietician consult on discharge      (P) 235

## 2021-09-23 NOTE — ASSESSMENT & PLAN NOTE
He presented to the ER with complaints of headache  Associated with nausea, vomiting and visual disturbances  CT head Shows-Subcentimeter hyperdense lesion at the posterior right centrum semiovale   The findings are nonspecific with differential considerations including a cavernoma, hemorrhagic lesion, among other etiologies  In addition, there is a 2 cm round area of low-attenuation at the medial right occipital lobe raising the concern for neoplasm or ischemia   No prior studies are available for comparison   An enhanced MRI of the brain is recommended for further evaluation  ER attending discussed case with neurology   Recommendation given to admit with MRI in the AM  Admit on observation  Telemetry monitoring  Neuro checks  Check MRI  Monitor for worsening headache  Monitor vital signs closely  OT, PT eval  Neurology consult  Am labs  Supportive care

## 2021-09-23 NOTE — ED PROVIDER NOTES
Signout Note / Progress Note:    1:06 AM  See ED course below for details  Briefly, I received sign-out on this 23-year-old male with history of diabetes and hypertension who presents to the ER for 1 day history of gradual onset headache pending results of a CT scan at time of sign-out  Headache was improved after treatment with Reglan and Ativan  There were no neuro deficits noted  The CT scan was abnormal and the patient subsequently underwent CTA of the head and neck after consultation with Stroke Neurology on-call  CTA was unremarkable other than the previously mentioned lesions found on the CT brain and the patient was admitted after discussion with the admitting provider for MRI of the brain  ED Course as of Sep 23 0106   Wed Sep 22, 2021   2242 They received a call from the radiologist regarding this patient which I received sign-out on from Dr Cathryne Mohs pending the results of his CT brain  Radiology reports to abnormalities of unclear etiology  First is a hyperdense lesion which could represent an AVM a cavernoma or possibly even hemorrhage  The 2nd is a right occipital hypodensity which could reflect an underlying mass  Radiology is recommending MRI brain at this time  2259 Updated patient and family at the bedside regarding the findings on CT scan  The patient is reassessed at this time  He reports mild headache at this time 3/10 improved from arrival   Denies any other complaints at this time  Upon questioning there is a strong family history of brain malignancy with the patient's brother having brain cancer and ultimately dying of it as well as the patient's mother  Family not sure the specifics regarding these diagnoses  Fall placed a paced [facility] to speak with specialties regarding possible transfer      2304 Message sent to Stroke Neurology on-call for further recs regarding this patient        9886 Laurita Polanco [facility] Neurology on-call thinks okay to remain here workup including CTA of the head neck as well as MRI of the brain  Will proceed with CTA of the head neck the ED and if no significant abnormalities requiring urgent/emergent transfer will admit here for MRI brain  Thu Sep 23, 2021   0102 Spoke with Hubert Watson after CTA shows nothing new, will admit here for MRI  Family updated               Indy Rodriguez, DO  09/23/21 0106

## 2021-09-23 NOTE — ED NOTES
Family at bedside   Pt resting comfortably and offers no complaints at this time         Shasta Pettit RN  09/22/21 4709

## 2021-09-23 NOTE — ASSESSMENT & PLAN NOTE
- CT head noting R centrum semiovale hyperintensity and hypo-attenuation in R occipital region  - CTA head/neck with no significant vascular abnormality/flow restrictive disease  - obtain MRI brain with and without contrast   - status post Reglan/Ativan for headache, PRN Zofran ordered  - supportive care

## 2021-09-23 NOTE — ASSESSMENT & PLAN NOTE
Presented to the ER for evaluation  of headache X 1 day   Associated with Nausea, visual disturbances  CT head Shows-Subcentimeter hyperdense lesion at the posterior right centrum semiovale   The findings are nonspecific with differential considerations including a cavernoma, hemorrhagic lesion, among other etiologies  In addition, there is a 2 cm round area of low-attenuation at the medial right occipital lobe raising the concern for neoplasm or ischemia   No prior studies are available for comparison   An enhanced MRI of the brain is recommended for further evaluation     He received normal saline 2 L,Reglan 10 mg IV in the ER and Ativan 2 mg IV  ER attending discussed case with Neurology  PRN tylenol  Zofran PRN  See plan for brain lesions

## 2021-09-23 NOTE — OCCUPATIONAL THERAPY NOTE
Occupational Therapy Evaluation     Patient Name: Deejay Germain  GAIQLAntonioI Date: 9/23/2021  Problem List  Principal Problem:    Brain lesion  Active Problems:    Type 2 diabetes mellitus with hyperglycemia, with long-term current use of insulin (HCC)    Headache, unspecified    Hyponatremia    Leukocytosis    Essential hypertension    Past Medical History  Past Medical History:   Diagnosis Date    Diabetes mellitus ([address] )     Hypertension      Past Surgical History  Past Surgical History:   Procedure Laterality Date    CARDIAC SURGERY               09/23/21 1422   OT Last Visit   OT Visit Date 09/23/21   Note Type   Note type Evaluation   Restrictions/Precautions   Weight Bearing Precautions Per Order No   Other Precautions Fall Risk   Pain Assessment   Pain Assessment Tool 0-10   Pain Score 3   Pain Location/Orientation Location: Head   Home Living   Type of Home House   Home Layout Two level;Bed/bath upstairs; Other (Comment)  (FOS to 2nd )   Bathroom Shower/Tub Tub/shower unit   Bathroom Toilet Standard   Bathroom Accessibility Accessible   Home Equipment Other (Comment)  (no DME)   Additional Comments pt does not utilize device at baseline during mobility   Prior Function   Level of [place] Independent with ADLs and functional mobility   Lives With Spouse   ADL Assistance Independent   IADLs Independent   Falls in the last 6 months 0   Vocational Full time employment   Comments pt is (I) with driving    Psychosocial   Psychosocial (WDL) WDL   Subjective   Subjective "I got my own water a while ago"   ADL   Where Assessed Edge of bed   LB Dressing Assistance 7  Independent   Additional Comments pt performs ADLs with no difficulties during session   Bed Mobility   Supine to Sit 7  Independent   Sit to Supine 7  Independent   Additional Comments pt on RA with no complaints of SOB; SPO2 WFL with no complaints   Transfers   Sit to Stand 7  Independent   Stand to Sit 7  Independent   Additional Comments pt performs with no device; no LOB or instability   Functional Mobility   Functional Mobility 7  Independent   Additional Comments pt performed functional mobility with no deviec ~200ft; no difficulties nor dizziness   Additional items   (no device)   Balance   Static Sitting Good   Dynamic Sitting Good   Static Standing Good   Dynamic Standing Good   Ambulatory Good   Activity Tolerance   Activity Tolerance Patient tolerated treatment well   RUE Assessment   RUE Assessment WFL   LUE Assessment   LUE Assessment WFL   Hand Function   Gross Motor Coordination Functional   Fine Motor Coordination Functional   Sensation   Light Touch No apparent deficits   Sharp/Dull No apparent deficits   Cognition   Overall Cognitive Status WFL   Arousal/Participation Alert   Attention Within functional limits   Orientation Level Oriented X4   Memory Within functional limits   Following Commands Follows all commands and directions without difficulty   Assessment   Assessment Patient is a 60 y o  male admitted to [facility] on 9/22/2021 due to Brain lesion  Pt performed at (I) level with no OT needs identified at this time Comorbidities affecting pt's physical performance at time of assessment include HTN, DM  The patient's raw score on the AM-PAC Daily Activity inpatient short form is 24, standardized score is 57 54, greater than 39 4  Patients at this level are likely to benefit from DC to home  From OT standpoint recommend anticipate no needs upon D/C  No further acute OT needs identified at this time  Recommend continued mobilization with hospital staff and restorative services while in the hospital to increase pts endurance and strength upon D/C  From OT standpoint, recommend D/C to home with family support when medically cleared  D/C pt from OT caseload at this time     Goals   Patient Goals to go home    Recommendation   OT Discharge Recommendation No rehabilitation needs   AM-PAC Daily Activity Inpatient   Lower Body Dressing 4   Bathing 4   Toileting 4   Upper Body Dressing 4   Grooming 4   Eating 4   Daily Activity Raw Score 24   Daily Activity Standardized Score (Calc for Raw Score >=11) 57 54   AM-PAC Applied Cognition Inpatient   Following a Speech/Presentation 4   Understanding Ordinary Conversation 4   Taking Medications 4   Remembering Where Things Are Placed or Put Away 4   Remembering List of 4-5 Errands 4   Taking Care of Complicated Tasks 4   Applied Cognition Raw Score 24   Applied Cognition Standardized Score 62 21     Pt is performing at [facility]; OT services will be discontinued at this time  Pt left supine in bed at end of session; all needs within reach; all lines intact

## 2021-09-23 NOTE — ASSESSMENT & PLAN NOTE
Sodium level at 132   Corrected to 134  He received normal saline boluses in the ER  Check Osmolality Urine, Sodium Urine random  Monitor sodium levels  Check AM CMP

## 2021-09-24 ENCOUNTER — APPOINTMENT (INPATIENT)
Dept: NON INVASIVE DIAGNOSTICS | Facility: HOSPITAL | Age: 56
DRG: 065 | End: 2021-09-24
Payer: COMMERCIAL

## 2021-09-24 VITALS
RESPIRATION RATE: 16 BRPM | SYSTOLIC BLOOD PRESSURE: 132 MMHG | TEMPERATURE: 98.1 F | DIASTOLIC BLOOD PRESSURE: 77 MMHG | HEIGHT: 67 IN | HEART RATE: 62 BPM | OXYGEN SATURATION: 97 % | BODY MASS INDEX: 28.58 KG/M2 | WEIGHT: 182.1 LBS

## 2021-09-24 PROBLEM — I63.9 CVA (CEREBROVASCULAR ACCIDENT) (HCC): Status: ACTIVE | Noted: 2021-09-23

## 2021-09-24 PROBLEM — Z91.199 NONCOMPLIANCE: Status: ACTIVE | Noted: 2021-09-24

## 2021-09-24 PROBLEM — Z91.19 NONCOMPLIANCE: Status: ACTIVE | Noted: 2021-09-24

## 2021-09-24 LAB
BILIRUB UR QL STRIP: NEGATIVE
CHOLEST SERPL-MCNC: 247 MG/DL (ref 50–200)
CLARITY UR: CLEAR
COLOR UR: YELLOW
GLUCOSE SERPL-MCNC: 208 MG/DL (ref 65–140)
GLUCOSE SERPL-MCNC: 97 MG/DL (ref 65–140)
GLUCOSE UR STRIP-MCNC: NEGATIVE MG/DL
HDLC SERPL-MCNC: 48 MG/DL
HGB UR QL STRIP.AUTO: NEGATIVE
KETONES UR STRIP-MCNC: NEGATIVE MG/DL
LDLC SERPL CALC-MCNC: 179 MG/DL (ref 0–100)
LEUKOCYTE ESTERASE UR QL STRIP: NEGATIVE
NITRITE UR QL STRIP: NEGATIVE
PH UR STRIP.AUTO: 6 [PH]
PROCALCITONIN SERPL-MCNC: <0.05 NG/ML
PROT UR STRIP-MCNC: NEGATIVE MG/DL
SP GR UR STRIP.AUTO: 1.01 (ref 1–1.03)
TRIGL SERPL-MCNC: 99 MG/DL
UROBILINOGEN UR QL STRIP.AUTO: 1 E.U./DL

## 2021-09-24 PROCEDURE — 84145 PROCALCITONIN (PCT): CPT | Performed by: PHYSICIAN ASSISTANT

## 2021-09-24 PROCEDURE — 82948 REAGENT STRIP/BLOOD GLUCOSE: CPT

## 2021-09-24 PROCEDURE — 93306 TTE W/DOPPLER COMPLETE: CPT | Performed by: INTERNAL MEDICINE

## 2021-09-24 PROCEDURE — 80061 LIPID PANEL: CPT | Performed by: PHYSICIAN ASSISTANT

## 2021-09-24 PROCEDURE — 93306 TTE W/DOPPLER COMPLETE: CPT

## 2021-09-24 PROCEDURE — 99239 HOSP IP/OBS DSCHRG MGMT >30: CPT | Performed by: PHYSICIAN ASSISTANT

## 2021-09-24 PROCEDURE — 81003 URINALYSIS AUTO W/O SCOPE: CPT | Performed by: PHYSICIAN ASSISTANT

## 2021-09-24 RX ORDER — ATORVASTATIN CALCIUM 40 MG/1
40 TABLET, FILM COATED ORAL EVERY EVENING
Qty: 30 TABLET | Refills: 0 | Status: SHIPPED | OUTPATIENT
Start: 2021-09-24 | End: 2021-11-26 | Stop reason: SDUPTHER

## 2021-09-24 RX ORDER — ASPIRIN 81 MG/1
81 TABLET, CHEWABLE ORAL DAILY
Qty: 30 TABLET | Refills: 0 | Status: SHIPPED | OUTPATIENT
Start: 2021-09-25

## 2021-09-24 RX ORDER — INSULIN GLARGINE 100 [IU]/ML
40 INJECTION, SOLUTION SUBCUTANEOUS EVERY MORNING
Status: DISCONTINUED | OUTPATIENT
Start: 2021-09-24 | End: 2021-09-24 | Stop reason: HOSPADM

## 2021-09-24 RX ORDER — LISINOPRIL 10 MG/1
10 TABLET ORAL DAILY
Qty: 30 TABLET | Refills: 0 | Status: SHIPPED | OUTPATIENT
Start: 2021-09-24 | End: 2021-11-26 | Stop reason: SDUPTHER

## 2021-09-24 RX ORDER — CLOPIDOGREL BISULFATE 75 MG/1
75 TABLET ORAL DAILY
Qty: 21 TABLET | Refills: 0 | Status: SHIPPED | OUTPATIENT
Start: 2021-09-24 | End: 2021-11-26 | Stop reason: ALTCHOICE

## 2021-09-24 RX ADMIN — ASPIRIN 81 MG CHEWABLE TABLET 81 MG: 81 TABLET CHEWABLE at 08:34

## 2021-09-24 RX ADMIN — ENOXAPARIN SODIUM 40 MG: 40 INJECTION SUBCUTANEOUS at 08:34

## 2021-09-24 RX ADMIN — AMLODIPINE BESYLATE 2.5 MG: 2.5 TABLET ORAL at 08:34

## 2021-09-24 RX ADMIN — SODIUM CHLORIDE 125 ML/HR: 0.9 INJECTION, SOLUTION INTRAVENOUS at 05:22

## 2021-09-24 RX ADMIN — INSULIN LISPRO 2 UNITS: 100 INJECTION, SOLUTION INTRAVENOUS; SUBCUTANEOUS at 11:54

## 2021-09-24 RX ADMIN — INSULIN GLARGINE 40 UNITS: 100 INJECTION, SOLUTION SUBCUTANEOUS at 10:03

## 2021-09-24 NOTE — PLAN OF CARE
Problem: NEUROSENSORY - ADULT  Goal: Achieves stable or improved neurological status  Description: INTERVENTIONS  - Monitor and report changes in neurological status  - Monitor vital signs such as temperature, blood pressure, glucose, and any other labs ordered   - Initiate measures to prevent increased intracranial pressure  - Monitor for seizure activity and implement precautions if appropriate      Outcome: Progressing     Problem: PAIN - ADULT  Goal: Verbalizes/displays adequate comfort level or baseline comfort level  Description: Interventions:  - Encourage patient to monitor pain and request assistance  - Assess pain using appropriate pain scale  - Administer analgesics based on type and severity of pain and evaluate response  - Implement non-pharmacological measures as appropriate and evaluate response  - Consider cultural and social influences on pain and pain management  - Notify physician/advanced practitioner if interventions unsuccessful or patient reports new pain  Outcome: Progressing     Problem: DISCHARGE PLANNING  Goal: Discharge to home or other facility with appropriate resources  Description: INTERVENTIONS:  - Identify barriers to discharge w/patient and caregiver  - Arrange for needed discharge resources and transportation as appropriate  - Identify discharge learning needs (meds, wound care, etc )  - Arrange for interpretive services to assist at discharge as needed  - Refer to Case Management Department for coordinating discharge planning if the patient needs post-hospital services based on physician/advanced practitioner order or complex needs related to functional status, cognitive ability, or social support system  Outcome: Progressing     Problem: Knowledge Deficit  Goal: Patient/family/caregiver demonstrates understanding of disease process, treatment plan, medications, and discharge instructions  Description: Complete learning assessment and assess knowledge base    Interventions:  - Provide teaching at level of understanding  - Provide teaching via preferred learning methods  Outcome: Progressing     Problem: Potential for Falls  Goal: Patient will remain free of falls  Description: INTERVENTIONS:  - Educate patient/family on patient safety including physical limitations  - Instruct patient to call for assistance with activity   - Consult OT/PT to assist with strengthening/mobility   - Keep Call bell within reach  - Keep bed low and locked with side rails adjusted as appropriate  - Keep care items and personal belongings within reach  - Initiate and maintain comfort rounds  - Make Fall Risk Sign visible to staff  - Offer Toileting every 2 Hours, in advance of need  - Initiate/Maintain bed alarm  - Obtain necessary fall risk management equipment: alarms  Problem: NEUROSENSORY - ADULT  Goal: Achieves stable or improved neurological status  Description: INTERVENTIONS  - Monitor and report changes in neurological status  - Monitor vital signs such as temperature, blood pressure, glucose, and any other labs ordered   - Initiate measures to prevent increased intracranial pressure  - Monitor for seizure activity and implement precautions if appropriate      9/23/2021 2007 by Neelam Pérez RN  Outcome: Progressing  9/23/2021 2006 by Neelam Pérez RN  Outcome: Progressing     Problem: PAIN - ADULT  Goal: Verbalizes/displays adequate comfort level or baseline comfort level  Description: Interventions:  - Encourage patient to monitor pain and request assistance  - Assess pain using appropriate pain scale  - Administer analgesics based on type and severity of pain and evaluate response  - Implement non-pharmacological measures as appropriate and evaluate response  - Consider cultural and social influences on pain and pain management  - Notify physician/advanced practitioner if interventions unsuccessful or patient reports new pain  9/23/2021 2007 by Neelam Pérez RN  Outcome: Progressing  9/23/2021 2006 by Viva Kanaris, RN  Outcome: Progressing     Problem: DISCHARGE PLANNING  Goal: Discharge to home or other facility with appropriate resources  Description: INTERVENTIONS:  - Identify barriers to discharge w/patient and caregiver  - Arrange for needed discharge resources and transportation as appropriate  - Identify discharge learning needs (meds, wound care, etc )  - Arrange for interpretive services to assist at discharge as needed  - Refer to Case Management Department for coordinating discharge planning if the patient needs post-hospital services based on physician/advanced practitioner order or complex needs related to functional status, cognitive ability, or social support system  9/23/2021 2007 by Lucero Marques RN  Outcome: Progressing  9/23/2021 2006 by Lucero Marques RN  Outcome: Progressing     Problem: Knowledge Deficit  Goal: Patient/family/caregiver demonstrates understanding of disease process, treatment plan, medications, and discharge instructions  Description: Complete learning assessment and assess knowledge base    Interventions:  - Provide teaching at level of understanding  - Provide teaching via preferred learning methods  9/23/2021 2007 by Lucero Marques RN  Outcome: Progressing  9/23/2021 2006 by Lucero Marques RN  Outcome: Progressing     Problem: Potential for Falls  Goal: Patient will remain free of falls  Description: INTERVENTIONS:  - Educate patient/family on patient safety including physical limitations  - Instruct patient to call for assistance with activity   - Consult OT/PT to assist with strengthening/mobility   - Keep Call bell within reach  - Keep bed low and locked with side rails adjusted as appropriate  - Keep care items and personal belongings within reach  - Initiate and maintain comfort rounds  - Make Fall Risk Sign visible to staff  - Offer Toileting every 2 Hours, in advance of need  - Initiate/Maintain bedalarm  - Obtain necessary fall risk management equipment: alarms  - Apply yellow socks and bracelet for high fall risk patients  - Consider moving patient to room near nurses station  9/23/2021 2007 by Delfina Urena RN  Outcome: Progressing  9/23/2021 2006 by Delfina Urena RN  Outcome: Progressing     Problem: Neurological Deficit  Goal: Neurological status is stable or improving  Description: Interventions:  - Monitor and assess patient's level of consciousness, motor function, sensory function, and level of assistance needed for ADLs  - Monitor and report changes from baseline  Collaborate with interdisciplinary team to initiate plan and implement interventions as ordered  - Provide and maintain a safe environment  - Consider seizure precautions  - Consider fall precautions  - Consider aspiration precautions  - Consider bleeding precautions  Outcome: Progressing     Problem: Activity Intolerance/Impaired Mobility  Goal: Mobility/activity is maintained at optimum level for patient  Description: Interventions:  - Assess and monitor patient  barriers to mobility and need for assistive/adaptive devices  - Assess patient's emotional response to limitations  - Collaborate with interdisciplinary team and initiate plans and interventions as ordered  - Encourage independent activity per ability   - Maintain proper body alignment  - Perform active/passive rom as tolerated/ordered  - Plan activities to conserve energy   - Turn patient as appropriate  Outcome: Progressing     Problem: Communication Impairment  Goal: Ability to express needs and understand communication  Description: Assess patient's communication skills and ability to understand information  Patient will demonstrate use of effective communication techniques, alternative methods of communication and understanding even if not able to speak  - Encourage communication and provide alternate methods of communication as needed  - Collaborate with case management/ for discharge needs    - Include patient/family/caregiver in decisions related to communication  Outcome: Progressing     Problem: Potential for Aspiration  Goal: Non-ventilated patient's risk of aspiration is minimized  Description: Assess and monitor vital signs, respiratory status, and labs (WBC)  Monitor for signs of aspiration (tachypnea, cough, rales, wheezing, cyanosis, fever)  - Assess and monitor patient's ability to swallow  - Place patient up in chair to eat if possible  - HOB up at 90 degrees to eat if unable to get patient up into chair   - Supervise patient during oral intake  - Instruct patient/ family to take small bites  - Instruct patient/ family to take small single sips when taking liquids  - Follow patient-specific strategies generated by speech pathologist   Outcome: Progressing  Goal: Ventilated patient's risk of aspiration is minimized  Description: Assess and monitor vital signs, respiratory status, airway cuff pressure, and labs (WBC)  Monitor for signs of aspiration (tachypnea, cough, rales, wheezing, cyanosis, fever)  - Elevate head of bed 30 degrees if patient has tube feeding   - Monitor tube feeding  Outcome: Progressing     Problem: Nutrition  Goal: Nutrition/Hydration status is improving  Description: Monitor and assess patient's nutrition/hydration status for malnutrition (ex- brittle hair, bruises, dry skin, pale skin and conjunctiva, muscle wasting, smooth red tongue, and disorientation)  Collaborate with interdisciplinary team and initiate plan and interventions as ordered  Monitor patient's weight and dietary intake as ordered or per policy  Utilize nutrition screening tool and intervene per policy  Determine patient's food preferences and provide high-protein, high-caloric foods as appropriate  - Assist patient with eating   - Allow adequate time for meals   - Encourage patient to take dietary supplement as ordered    - Collaborate with clinical nutritionist   - Include patient/family/caregiver in decisions related to nutrition    Outcome: Progressing     - Apply yellow socks and bracelet for high fall risk patients  - Consider moving patient to room near nurses station  Outcome: Progressing

## 2021-09-24 NOTE — PLAN OF CARE
Problem: NEUROSENSORY - ADULT  Goal: Achieves stable or improved neurological status  Description: INTERVENTIONS  - Monitor and report changes in neurological status  - Monitor vital signs such as temperature, blood pressure, glucose, and any other labs ordered   - Initiate measures to prevent increased intracranial pressure  - Monitor for seizure activity and implement precautions if appropriate      9/24/2021 1205 by Kady Estrada LPN  Outcome: Adequate for Discharge  9/24/2021 0748 by Kady Estrada LPN  Outcome: Progressing     Problem: PAIN - ADULT  Goal: Verbalizes/displays adequate comfort level or baseline comfort level  Description: Interventions:  - Encourage patient to monitor pain and request assistance  - Assess pain using appropriate pain scale  - Administer analgesics based on type and severity of pain and evaluate response  - Implement non-pharmacological measures as appropriate and evaluate response  - Consider cultural and social influences on pain and pain management  - Notify physician/advanced practitioner if interventions unsuccessful or patient reports new pain  9/24/2021 1205 by Kady Estrada LPN  Outcome: Adequate for Discharge  9/24/2021 0748 by Kady Estrada LPN  Outcome: Progressing     Problem: DISCHARGE PLANNING  Goal: Discharge to home or other facility with appropriate resources  Description: INTERVENTIONS:  - Identify barriers to discharge w/patient and caregiver  - Arrange for needed discharge resources and transportation as appropriate  - Identify discharge learning needs (meds, wound care, etc )  - Arrange for interpretive services to assist at discharge as needed  - Refer to Case Management Department for coordinating discharge planning if the patient needs post-hospital services based on physician/advanced practitioner order or complex needs related to functional status, cognitive ability, or social support system  9/24/2021 1205 by Kady Estrada LPN  Outcome: Adequate for Discharge  9/24/2021 0748 by Genaro Youssef LPN  Outcome: Progressing     Problem: Knowledge Deficit  Goal: Patient/family/caregiver demonstrates understanding of disease process, treatment plan, medications, and discharge instructions  Description: Complete learning assessment and assess knowledge base  Interventions:  - Provide teaching at level of understanding  - Provide teaching via preferred learning methods  9/24/2021 1205 by Genrao Youssef LPN  Outcome: Adequate for Discharge  9/24/2021 0748 by Genaro Youssef LPN  Outcome: Progressing     Problem: Potential for Falls  Goal: Patient will remain free of falls  Description: INTERVENTIONS:  - Educate patient/family on patient safety including physical limitations  - Instruct patient to call for assistance with activity   - Consult OT/PT to assist with strengthening/mobility   - Keep Call bell within reach  - Keep bed low and locked with side rails adjusted as appropriate  - Keep care items and personal belongings within reach  - Initiate and maintain comfort rounds  - Make Fall Risk Sign visible to staff  - Offer Toileting every 2 Hours, in advance of need  - Initiate/Maintain bed alarm  - Obtain necessary fall risk management equipment: bed alarm  - Apply yellow socks and bracelet for high fall risk patients  - Consider moving patient to room near nurses station  9/24/2021 1205 by Genaro Youssef LPN  Outcome: Adequate for Discharge  9/24/2021 0748 by Genaro Youssef LPN  Outcome: Progressing     Problem: Neurological Deficit  Goal: Neurological status is stable or improving  Description: Interventions:  - Monitor and assess patient's level of consciousness, motor function, sensory function, and level of assistance needed for ADLs  - Monitor and report changes from baseline  Collaborate with interdisciplinary team to initiate plan and implement interventions as ordered  - Provide and maintain a safe environment    - Consider seizure precautions  - Consider fall precautions  - Consider aspiration precautions  - Consider bleeding precautions  9/24/2021 1205 by Mya Carrion LPN  Outcome: Adequate for Discharge  9/24/2021 0748 by Mya Carrion LPN  Outcome: Progressing     Problem: Activity Intolerance/Impaired Mobility  Goal: Mobility/activity is maintained at optimum level for patient  Description: Interventions:  - Assess and monitor patient  barriers to mobility and need for assistive/adaptive devices  - Assess patient's emotional response to limitations  - Collaborate with interdisciplinary team and initiate plans and interventions as ordered  - Encourage independent activity per ability   - Maintain proper body alignment  - Perform active/passive rom as tolerated/ordered  - Plan activities to conserve energy   - Turn patient as appropriate  9/24/2021 1205 by Mya Carrion LPN  Outcome: Adequate for Discharge  9/24/2021 0748 by Mya Carrion LPN  Outcome: Progressing     Problem: Communication Impairment  Goal: Ability to express needs and understand communication  Description: Assess patient's communication skills and ability to understand information  Patient will demonstrate use of effective communication techniques, alternative methods of communication and understanding even if not able to speak  - Encourage communication and provide alternate methods of communication as needed  - Collaborate with case management/ for discharge needs  - Include patient/family/caregiver in decisions related to communication  9/24/2021 1205 by Mya Carrion LPN  Outcome: Adequate for Discharge  9/24/2021 0748 by Mya Carrion LPN  Outcome: Progressing     Problem: Potential for Aspiration  Goal: Non-ventilated patient's risk of aspiration is minimized  Description: Assess and monitor vital signs, respiratory status, and labs (WBC)    Monitor for signs of aspiration (tachypnea, cough, rales, wheezing, cyanosis, fever)  - Assess and monitor patient's ability to swallow  - Place patient up in chair to eat if possible  - HOB up at 90 degrees to eat if unable to get patient up into chair   - Supervise patient during oral intake  - Instruct patient/ family to take small bites  - Instruct patient/ family to take small single sips when taking liquids  - Follow patient-specific strategies generated by speech pathologist   9/24/2021 1205 by Kia Diez LPN  Outcome: Adequate for Discharge  9/24/2021 0748 by Kia Diez LPN  Outcome: Progressing  Goal: Ventilated patient's risk of aspiration is minimized  Description: Assess and monitor vital signs, respiratory status, airway cuff pressure, and labs (WBC)  Monitor for signs of aspiration (tachypnea, cough, rales, wheezing, cyanosis, fever)  - Elevate head of bed 30 degrees if patient has tube feeding   - Monitor tube feeding  9/24/2021 1205 by Kia Diez LPN  Outcome: Adequate for Discharge  9/24/2021 0748 by Kia Diez LPN  Outcome: Progressing     Problem: Nutrition  Goal: Nutrition/Hydration status is improving  Description: Monitor and assess patient's nutrition/hydration status for malnutrition (ex- brittle hair, bruises, dry skin, pale skin and conjunctiva, muscle wasting, smooth red tongue, and disorientation)  Collaborate with interdisciplinary team and initiate plan and interventions as ordered  Monitor patient's weight and dietary intake as ordered or per policy  Utilize nutrition screening tool and intervene per policy  Determine patient's food preferences and provide high-protein, high-caloric foods as appropriate  - Assist patient with eating   - Allow adequate time for meals   - Encourage patient to take dietary supplement as ordered  - Collaborate with clinical nutritionist   - Include patient/family/caregiver in decisions related to nutrition    9/24/2021 1205 by Kia Diez LPN  Outcome: Adequate for Discharge  9/24/2021 5884 by Cathren Cranker, LPN  Outcome: Progressing

## 2021-09-24 NOTE — ASSESSMENT & PLAN NOTE
Medications and dietary non-compliance  Patient notes he is not routinely taking his chronic medications  Ran out and has not followed up with his PCP within a few years  He does take Lantus but takes varying doses  "When my blood sugar is high I take more, when it's low I take less"  He seems understanding and motivated to be more compliant with medications and lifestyle changes in general    Recommended PCP follow up within 1 week

## 2021-09-24 NOTE — ASSESSMENT & PLAN NOTE
WBC level at 15 78  No clear source of infection  Tested for COVID 19 with negative results  Blood cultures x 2 - no growth    Procalcitonin < 0 05

## 2021-09-24 NOTE — CASE MANAGEMENT
Case Management Assessment & Discharge Planning Note    Patient name Esperanza Prado  Location /800-65 MRN [id number]  : 1965 Date 2021       Current Admission Date: 2021  Current Admission Diagnosis:  Brain lesion  Previous Admission - Discharge Date:10/27/20   LOS (days): 1  Geometric Mean LOS (GMLOS) (days): 2 90  Days to [facility] Previous Discharge Diagnosis:  There are no discharge diagnoses documented for the most recent discharge  OBJECTIVE:    Risk of Unplanned Readmission Score: 12   Bundle(if applicable):    Current admission status: Inpatient       Preferred Pharmacy:   [address] [facility] [address]  [place], [address]  [address] [facility] [address]  Phone: [phone] Fax: [phone]    Primary Care Provider: No primary care provider on file  Primary Insurance: Audrene Roulette SHIELD  Secondary Insurance:     ASSESSMENT:  [address] [facility], [address] - Spouse   Primary Phone: [phone] (Mobile)               Advance Directives  Does patient have a [address]?: No  Was patient offered paperwork?: Yes (declined)  Does patient currently have a Health Care decision maker?: Yes, please see Health Care Proxy section  Does patient have Advance Directives?: No  Was patient offered paperwork?: Yes (declined)              Readmission Root Cause  30 Day Readmission: No    Patient Information  Admitted from[de-identified] Home  Mental Status: Alert  During Assessment patient was accompanied by: Not accompanied during assessment  Assessment information provided by[de-identified] Patient  Primary Caregiver: Self  Support Systems: Self, Spouse/significant other  [place] of Residence: [address] do you live in?: [address] entry access options   Select all that apply : Stairs  Number of steps to enter home : 5  Do the steps have railings?: No  Type of Current Residence: [address] home  Upon entering residence, is there a bedroom on the main floor (no further steps)?: No  A bedroom is located on the following floor levels of residence (select all that apply):: 2nd Floor  Upon entering residence, is there a bathroom on the main floor (no further steps)?: No  Indicate which floors of current residence have a bathroom (select all the apply):: 2nd Floor  Number of steps to 2nd floor from main floor: One Flight  Living Arrangements: Lives w/ Spouse/significant other  Is patient a ?: No    Activities of Daily Living Prior to Admission  Functional Status: Independent  Completes ADLs independently?: Yes  Ambulates independently?: Yes  Does patient use assisted devices?: No  Does patient currently own DME?: No  Does patient have a history of Outpatient Therapy (PT/OT)?: Yes (o/p therapy for his arms in the past,  not sure of facility)  Does the patient have a history of Short-Term Rehab?: No  Does patient have a history of HHC?: No  Does patient currently have [facility]?: No         Patient Information Continued  Income Source: Employed  Does patient have prescription coverage?: Yes  Does patient receive dialysis treatments?: No  Does patient have a history of substance abuse?: No  Does patient have a history of Mental Health Diagnosis?: No         Means of Transportation  Means of Transport to Appts[de-identified] Drives Self  In the past 12 months, has lack of transportation kept you from medical appointments or from getting medications?: No  In the past 12 months, has lack of transportation kept you from meetings, work, or from getting things needed for daily living?: No  Was application for public transport provided?: No    DISCHARGE DETAILS:    Discharge planning discussed with[de-identified] patient          Contacts  Contact Method:  In Person  Reason/Outcome: Discharge [address]         [facility]?: No    DME Referral Provided  Referral made for DME?: No         We would like to be able to fill any required prescriptions on discharge at our [address] [place] [address] Jade Webb and have them delivered to you at discharge in your room  Would you like to participate in this program? : No - Declined    Discharge Destination Plan[de-identified] Home     Type of Transport: Auto with designated        Discussed with patient preferences on discharge;understanding how to manage health at home; purpose of taking medications; importance of follow up care/appointments; and symptoms to watch out for once discharged home  Pt is to follow up with neurology and endocrinology  Pt states he will call for appt  Pt also not set up with PCP and he states he has idea of who he wants to go that he used in the past and he will call for appt

## 2021-09-24 NOTE — PLAN OF CARE
Problem: NEUROSENSORY - ADULT  Goal: Achieves stable or improved neurological status  Description: INTERVENTIONS  - Monitor and report changes in neurological status  - Monitor vital signs such as temperature, blood pressure, glucose, and any other labs ordered   - Initiate measures to prevent increased intracranial pressure  - Monitor for seizure activity and implement precautions if appropriate      Outcome: Progressing     Problem: PAIN - ADULT  Goal: Verbalizes/displays adequate comfort level or baseline comfort level  Description: Interventions:  - Encourage patient to monitor pain and request assistance  - Assess pain using appropriate pain scale  - Administer analgesics based on type and severity of pain and evaluate response  - Implement non-pharmacological measures as appropriate and evaluate response  - Consider cultural and social influences on pain and pain management  - Notify physician/advanced practitioner if interventions unsuccessful or patient reports new pain  Outcome: Progressing     Problem: DISCHARGE PLANNING  Goal: Discharge to home or other facility with appropriate resources  Description: INTERVENTIONS:  - Identify barriers to discharge w/patient and caregiver  - Arrange for needed discharge resources and transportation as appropriate  - Identify discharge learning needs (meds, wound care, etc )  - Arrange for interpretive services to assist at discharge as needed  - Refer to Case Management Department for coordinating discharge planning if the patient needs post-hospital services based on physician/advanced practitioner order or complex needs related to functional status, cognitive ability, or social support system  Outcome: Progressing     Problem: Knowledge Deficit  Goal: Patient/family/caregiver demonstrates understanding of disease process, treatment plan, medications, and discharge instructions  Description: Complete learning assessment and assess knowledge base    Interventions:  - Provide teaching at level of understanding  - Provide teaching via preferred learning methods  Outcome: Progressing     Problem: Potential for Falls  Goal: Patient will remain free of falls  Description: INTERVENTIONS:  - Educate patient/family on patient safety including physical limitations  - Instruct patient to call for assistance with activity   - Consult OT/PT to assist with strengthening/mobility   - Keep Call bell within reach  - Keep bed low and locked with side rails adjusted as appropriate  - Keep care items and personal belongings within reach  - Initiate and maintain comfort rounds  - Make Fall Risk Sign visible to staff  - Offer Toileting every 2 Hours, in advance of need  - Initiate/Maintain bed alarm  - Obtain necessary fall risk management equipment: bed alarm  - Apply yellow socks and bracelet for high fall risk patients  - Consider moving patient to room near nurses station  Outcome: Progressing     Problem: Neurological Deficit  Goal: Neurological status is stable or improving  Description: Interventions:  - Monitor and assess patient's level of consciousness, motor function, sensory function, and level of assistance needed for ADLs  - Monitor and report changes from baseline  Collaborate with interdisciplinary team to initiate plan and implement interventions as ordered  - Provide and maintain a safe environment  - Consider seizure precautions  - Consider fall precautions  - Consider aspiration precautions  - Consider bleeding precautions  Outcome: Progressing     Problem: Activity Intolerance/Impaired Mobility  Goal: Mobility/activity is maintained at optimum level for patient  Description: Interventions:  - Assess and monitor patient  barriers to mobility and need for assistive/adaptive devices  - Assess patient's emotional response to limitations  - Collaborate with interdisciplinary team and initiate plans and interventions as ordered    - Encourage independent activity per ability   - Maintain proper body alignment  - Perform active/passive rom as tolerated/ordered  - Plan activities to conserve energy   - Turn patient as appropriate  Outcome: Progressing     Problem: Communication Impairment  Goal: Ability to express needs and understand communication  Description: Assess patient's communication skills and ability to understand information  Patient will demonstrate use of effective communication techniques, alternative methods of communication and understanding even if not able to speak  - Encourage communication and provide alternate methods of communication as needed  - Collaborate with case management/ for discharge needs  - Include patient/family/caregiver in decisions related to communication  Outcome: Progressing     Problem: Potential for Aspiration  Goal: Non-ventilated patient's risk of aspiration is minimized  Description: Assess and monitor vital signs, respiratory status, and labs (WBC)  Monitor for signs of aspiration (tachypnea, cough, rales, wheezing, cyanosis, fever)  - Assess and monitor patient's ability to swallow  - Place patient up in chair to eat if possible  - HOB up at 90 degrees to eat if unable to get patient up into chair   - Supervise patient during oral intake  - Instruct patient/ family to take small bites  - Instruct patient/ family to take small single sips when taking liquids  - Follow patient-specific strategies generated by speech pathologist   Outcome: Progressing  Goal: Ventilated patient's risk of aspiration is minimized  Description: Assess and monitor vital signs, respiratory status, airway cuff pressure, and labs (WBC)  Monitor for signs of aspiration (tachypnea, cough, rales, wheezing, cyanosis, fever)  - Elevate head of bed 30 degrees if patient has tube feeding   - Monitor tube feeding    Outcome: Progressing     Problem: Nutrition  Goal: Nutrition/Hydration status is improving  Description: Monitor and assess patient's nutrition/hydration status for malnutrition (ex- brittle hair, bruises, dry skin, pale skin and conjunctiva, muscle wasting, smooth red tongue, and disorientation)  Collaborate with interdisciplinary team and initiate plan and interventions as ordered  Monitor patient's weight and dietary intake as ordered or per policy  Utilize nutrition screening tool and intervene per policy  Determine patient's food preferences and provide high-protein, high-caloric foods as appropriate  - Assist patient with eating   - Allow adequate time for meals   - Encourage patient to take dietary supplement as ordered  - Collaborate with clinical nutritionist   - Include patient/family/caregiver in decisions related to nutrition    Outcome: Progressing

## 2021-09-24 NOTE — DISCHARGE INSTRUCTIONS
Follow up with your PCP within 1 week of discharge if possible - call for appointment  Stroke   AMBULATORY CARE:   A stroke  happens when blood flow to part of the brain is interrupted  This can cause serious brain damage from a lack of oxygen  A stroke caused by a blood clot is called an ischemic stroke  A stroke caused by a burst or torn blood vessel is called an intracerebral hemorrhage, or a hemorrhagic stroke  When stroke symptoms go away completely within minutes to hours and do not cause damage, it is called a transient ischemic attack (TIA)  A TIA is a warning sign that you are at risk of soon having a stroke  Know the warning signs of a stroke: The words BE FAST can help you remember and recognize warning signs of a stroke:  · B = Balance:  Sudden loss of balance    · E = Eyes:  Loss of vision in one or both eyes    · F = Face:  Face droops on one side    · A = Arms:  Arm drops when both arms are raised    · S = Speech:  Speech is slurred or sounds different    · T = Time:  Time to get help immediately     Call your local emergency number (911 in the [address] [facility] [facility]) for any of the following:   · You have any of the following signs of a stroke:      ? Numbness or drooping on one side of your face     ? Weakness in an arm or leg    ? Confusion or difficulty speaking    ? Dizziness, a severe headache, or vision loss       · You have a seizure  · You have chest pain or shortness of breath  Seek care immediately if:   · Your arm or leg feels warm, tender, and painful  It may look swollen and red  · You have loss of balance or coordination  · You have double vision or vision loss  · You have unusual or heavy bleeding  Call your doctor or neurologist if:   · Your blood sugar level or blood pressure is higher or lower than usual     · You have trouble swallowing  · You have trouble having a bowel movement or urinating  · You have questions or concerns about your condition or care      Signs and symptoms  depend on the type of stroke you had and where it occurred:  · Loss of consciousness    · Loss of vision in one or both eyes    · Sudden weakness or paralysis in your arm, leg, or face    · Sudden trouble walking, speaking, or understanding words you hear or read    · Vomiting or a severe headache    Treatment  depends on the type of stroke you had:  · Medicines  may be given to prevent or break up blood clots, or help your blood clot more easily  You may also need medicines to treat high cholesterol, high blood pressure, or diabetes  · Thrombolysis  is a procedure used to break apart clots in an artery  A catheter is guided into the artery until it is near the clot  Medicine is put through the catheter that will help break apart the clot  The clot may be pulled out of the artery  · Surgery  may be used to remove a blood clot or to relieve pressure within your brain  You may also need surgery to remove plaque buildup from your carotid arteries  Recovery testing: Your healthcare provider will test your recovery 90 days (3 months) after your stroke  This may be done over the phone or in person  Your provider will ask how well you can do the activities you did before the stroke  He or she will also ask how well you can do your daily activities without help  Your provider may make recommendations for you based on your test  For example, you may need someone to help you walk safely  You may also need help with daily activities, such as getting dressed  Based on your answers, your provider may do this test again over time  Manage the effects of a stroke:   · Go to stroke rehabilitation (rehab) if directed  Rehab is a program run by specialists who will help you recover abilities you may have lost  Specialists include physical, occupational, and speech therapists  Physical therapists help you gain strength or keep your balance  Occupational therapists teach you new ways to do daily activities  Your therapy may include movements for everyday activities  An example is being able to raise yourself from a chair  A speech therapist helps you improve your ability to talk and swallow  · Make your home safe  Remove anything you might trip over  Tape electrical cords down  Keep paths clear throughout your home  Make sure your home is well lit  Put nonslip materials on surfaces that might be slippery  An example is your bathtub or shower floor  A cane or walker may help you keep your balance as you walk  Prevent another stroke:   · Manage health conditions  A condition such as diabetes can increase your risk for a stroke  Control your blood sugar level if you have hyperglycemia or diabetes  Take your prescribed medicines and check your blood sugar level as directed  · Check your blood pressure as directed  High blood pressure can increase your risk for a stroke  Follow your healthcare provider's directions for controlling your blood pressure  · Do not use nicotine products or illegal drugs  Nicotine and other chemicals in cigarettes and cigars can cause blood vessel damage  Nicotine and illegal drugs both increase your risk for a stroke  Ask your healthcare provider for information if you currently smoke or use drugs and need help to quit  E- cigarettes or smokeless tobacco still contain nicotine  Talk to your healthcare provider before you use these products  · Talk to your healthcare provider about alcohol  Alcohol can raise your blood pressure  The recommended limit is 2 drinks in a day for men and 1 drink in a day for women  Do not binge drink or save a week's worth of alcohol to drink in 1 or 2 days  Limit weekly amounts as directed by your provider  · Eat a variety of healthy foods  Healthy foods include whole-grain breads, low-fat dairy products, beans, lean meats, and fish  Eat at least 5 servings of fruits and vegetables each day   Choose foods that are low in fat, cholesterol, salt, and sugar  Eat foods that are high in potassium, such as potatoes and bananas  A dietitian can help you create healthy meal plans  · Maintain a healthy weight  Ask your healthcare provider how much you should weigh  Ask him or her to help you create a weight loss plan if you are overweight  He or she can help you create small goals if you have a lot of weight to lose  · Exercise as directed  Exercise can lower your blood pressure, cholesterol, weight, and blood sugar levels  Healthcare providers will help you create exercise goals  They can also help you make a plan to reach your goals  For example, you can break exercise into 10 minute periods, 3 times in the day  Find an exercise that you enjoy  This will make it easier for you to reach your exercise goals  · Manage stress  Stress can raise your blood pressure  Find new ways to relax, such as deep breathing or listening to music  What you need to know about depression after a stroke:  Talk to your healthcare provider if you have depression that continues or is getting worse  Your provider may be able to help treat your depression  Your provider can also recommend support groups for you to join  A support group is a place to talk with others who have had a stroke  It may also help to talk to friends and family members about how you are feeling  Tell your family and friends to let your healthcare provider know if they see any signs of depression:  · Extreme sadness    · Avoiding social interaction with family or friends    · A lack of interest in things you once enjoyed    · Irritability    · Trouble sleeping    · Low energy levels    · A change in eating habits or sudden weight gain or loss    Follow up with your doctor or neurologist as directed: You may need to come in for regular tests of your brain function  Your medicines may also need to be checked   Write down your questions so you remember to ask them during your visits  For support and more information:   · American Heart Association and American Stroke Association  [address] , [address] [facility]   Phone: 9- 446 - [phone]  Web Address: https://www strong com/  [facility] [address] 2021 Information is for End User's use only and may not be sold, redistributed or otherwise used for commercial purposes  All illustrations and images included in CareNotes® are the copyrighted property of [employer]  or [address] [place] [address]  The above information is an  only  It is not intended as medical advice for individual conditions or treatments  Talk to your doctor, nurse or pharmacist before following any medical regimen to see if it is safe and effective for you

## 2021-09-24 NOTE — ASSESSMENT & PLAN NOTE
He is prescribed Amlodipine and lisinopril but reports noncompliance - recommend strict adherence on discharge  Of note, permissive hypertension allowed once acute CVA identified  Will need tighter blood pressure control with close PCP follow up on discharge  Low salt diet recommended  /77 on the day of discharge

## 2021-09-24 NOTE — DISCHARGE SUMMARY
[address] [facility] [address]  Discharge- Shanice Verde 1965, 60 y o  male MRN: [id number]  Unit/Bed#: 405-01 Encounter: [id number]  Primary Care Provider: No primary care provider on file  Date and time admitted to hospital: 9/22/2021  8:31 PM    * CVA (cerebrovascular accident) [facility])  Assessment & Plan  · He presented to the ER with complaints of headache  Associated with nausea, vomiting and visual disturbances  · CT head had abnormal findings - Subcentimeter hyperdense lesion at the posterior right centrum semiovale   The findings are nonspecific with differential considerations including a cavernoma, hemorrhagic lesion, among other etiologies  In addition, there is a 2 cm round area of low-attenuation at the medial right occipital lobe raising the concern for neoplasm or ischemia   No prior studies are available for comparison   An enhanced MRI of the brain is recommended for further evaluation  · ER attending discussed case with neurology  Recommendation given to admit with follow up MRI for further detail  · MRI brain 9/23/21 -  Recent infarcts are seen in the right occipital lobe, and tiny lacunar infarct right medial thalamus  No evidence of hemorrhage  · Further stroke workup / treatment:  · Stroke protocol followed with frequent VS, neuro checks  · Telemetry monitoring: stable,  No signs of atrial fibrillation overnight  Recommend loop recorder on discharge to further assess for paroxysmal atrial fibrillation  · Echocardiogram - unremarkable, EF 30%, mild diastolic dysfunction noted  · Anti-platelet: Patient received aspirin 325mg x 1 dose, to be continued at 81mg daily  Neurology also recommending dual anti-platelet therapy with addition of Plavix on discharge for 3 weeks, then transition to aspirin alone thereafter  · Hypertension: Permissive hypertension allowed initially   Will need to consider adjustment of anti-hypertensive regimen as needed  · Hyperlipidemia: LDL of 179 noted  Patient was started on Lipitor 40mg daily during this hospitalization  Previously on crestor, but has not been compliant with this medication  · Uncontrolled DM2: patient has dietary noncompliance  Will need to continue current insulin regimen with Lantus, metformin as recommended at recent endocrinology visit  keep a food diary and strict BG diary with further outpatient adjustments as needed  · OT, PT evaluation - no need for PT/OT services, safe discharge home  · D/w Neurology  Will coordinate outpatient follow up with patient  Type 2 diabetes mellitus with hyperglycemia, with long-term current use of insulin [facility])  Assessment & Plan  Lab Results   Component Value Date    HGBA1C 10 8 (H) 09/23/2021     Lantus prescribed at 60 units daily per endocrinology  Patient admits he adjusts Lantus day to day based on his blood glucose levels  Utilized Fingerstick glucose 4 times daily AC/HS, Sliding scale insulin PRN  Fasting BG was well controlled here while observed on diabetic diet  Suspect dietary noncompliance  Will need to keep a food diary, blood glucose diary to present to his PCP at his follow up visit  He needs close outpatient follow up with endocrinologist as well  Would benefit from formal dietician consult on discharge  (P) 190    Noncompliance  Assessment & Plan  Medications and dietary non-compliance  Patient notes he is not routinely taking his chronic medications  Ran out and has not followed up with his PCP within a few years  He does take Lantus but takes varying doses  "When my blood sugar is high I take more, when it's low I take less"  He seems understanding and motivated to be more compliant with medications and lifestyle changes in general    Recommended PCP follow up within 1 week  Essential hypertension  Assessment & Plan  He is prescribed Amlodipine and lisinopril but reports noncompliance - recommend strict adherence on discharge    Of note, permissive hypertension allowed once acute CVA identified  Will need tighter blood pressure control with close PCP follow up on discharge  Low salt diet recommended  /77 on the day of discharge  Leukocytosis  Assessment & Plan  WBC level at 15 78  No clear source of infection  Tested for COVID 19 with negative results  Blood cultures x 2 - no growth  Procalcitonin < 0 05  Hyponatremia  Assessment & Plan  Sodium level at 132 on admission, corrected to 134   resolved on 9/23 with IV fluid hydration  Headache, unspecified  Assessment & Plan  In the setting of acute stroke, now improved with better BP control, IV fluids  Medical Problems     Resolved Problems  Date Reviewed: 9/24/2021    None              Discharging Physician / Practitioner: Edith Fisher PA-C  PCP: No primary care provider on file  Admission Date:   Admission Orders (From admission, onward)     Ordered        09/23/21 1732  Inpatient Admission  Once         09/23/21 0055  Place in Observation  Once                   Discharge Date: 09/24/21    Consultations During Hospital Stay:  · none    Procedures Performed:   · none    Significant Findings / Test Results:   CTA head and neck with and without contrast  Result Date: 9/23/2021  Impression: No focal stenosis or saccular aneurysm within the [place]  No hemodynamically significant stenosis within either common or internal carotid artery  Less than 50% stenosis by NASCET criteria  Dominant left vertebral artery  Please refer to the report of a CT brain performed approximately 2 hours prior for additional details  Workstation performed: [id number]     X-ray chest 1 view portable  Result Date: 9/23/2021  Impression: No acute cardiopulmonary disease  Workstation performed: [id number]     CT head without contrast  Result Date: 9/22/2021  Impression: Subcentimeter hyperdense lesion at the posterior right centrum semiovale    The findings are nonspecific with differential considerations including a cavernoma, hemorrhagic lesion, among other etiologies  In addition, there is a 2 cm round area of low-attenuation at the medial right occipital lobe raising the concern for neoplasm or ischemia  No prior studies are available for comparison  An enhanced MRI of the brain is recommended for further evaluation  I personally discussed this study with Dr Elizabeth Bauer on 9/22/2021 at 10:39 PM  Workstation performed: [id number]       MRI brain w wo contrast  Result Date: 9/23/2021  Impression: Recent infarcts are seen in the right occipital lobe, and tiny lacunar infarct right medial thalamus  No evidence of hemorrhage  Workstation performed: [id number]       Echocardiogram Summary 9/24/21:  SUMMARY  LEFT VENTRICLE:  Systolic function was normal  Ejection fraction was estimated to be 60 %  There were no regional wall motion abnormalities  Doppler parameters were consistent with abnormal left ventricular relaxation (grade 1 diastolic dysfunction)  RIGHT VENTRICLE:  The size was normal   Systolic function was normal   MITRAL VALVE:  There was trace regurgitation    TRICUSPID VALVE:  There was trace regurgitation          Incidental Findings:   · none    Test Results Pending at Discharge (will require follow up):   · none     Outpatient Tests Requested:  · none    Complications:  none    Reason for Admission:  Headache, abnormal CTA / CT head  Discharge diagnosis: Acute CVA    Hospital Course:   Mati Swan is a 60 y o  male patient who originally presented to the hospital on 9/22/2021 due to Headache (x2 day with tunnel vision  reports nausea and light sensitivity  tylenol last 9/21  denies head injury)  Please refer to admission H&P further details regarding the patient's initial presentation and management  The patients CT head / CTA head and neck noted some abnormal findings, prompting further admission for MRI brain  Unfortunately, acute CVA was noted   Workup included telemetry monitoring, echocardiogram, lipid panel, A1c, PT/OT evaluation, and close vital sign monitoring and neurologic checks  He was started on dual antiplatelet therapy with aspirin and plavix as well as statin therapy  He will need his uncontrolled diabetes more tightly managed, this is likely driven by dietary noncompliance, as well as some medication noncompliance  This was discussed with the patient at length  He reports motivation to improve his health status  Will need close follow up with PCP as well as neurology after discharge  Ambulatory referral sent to cardiology to help arrange for loop recorder on discharge  Please see above list of diagnoses and related plan for additional information  Condition at Discharge: good    Discharge Day Visit / Exam:   Subjective:  Patient is feeling well today, HA 80% improved  Vision changes, dizziness resolved  Good appetite  Feels well to go home  PT/OT cleared for discharge  Vitals: Blood Pressure: 132/77 (09/24/21 0712)  Pulse: 62 (09/24/21 0712)  Temperature: 98 1 °F (36 7 °C) (09/24/21 0712)  Temp Source: Oral (09/24/21 0691)  Respirations: 16 (09/24/21 0712)  Height: 5' 7" (170 2 cm) (09/23/21 2017)  Weight - Scale: 82 6 kg (182 lb 1 6 oz) (09/24/21 0600)  SpO2: 97 % (09/24/21 0520)  Exam:   Physical Exam  Vitals and nursing note reviewed  Constitutional:       General: He is not in acute distress  Appearance: Normal appearance  He is not ill-appearing or diaphoretic  HENT:      Head: Normocephalic  Mouth/Throat:      Mouth: Mucous membranes are moist    Cardiovascular:      Rate and Rhythm: Normal rate and regular rhythm  Heart sounds: No murmur heard  Pulmonary:      Effort: Pulmonary effort is normal       Breath sounds: Normal breath sounds  No wheezing  Musculoskeletal:      Right lower leg: No edema  Left lower leg: No edema  Skin:     General: Skin is warm and dry  Neurological:      General: No focal deficit present        Mental Status: He is alert  He is disoriented  Cranial Nerves: No cranial nerve deficit  Sensory: No sensory deficit  Motor: No weakness  Psychiatric:         Mood and Affect: Mood normal               Discharge instructions/Information to patient and family:   See after visit summary for information provided to patient and family  Provisions for Follow-Up Care:  See after visit summary for information related to follow-up care and any pertinent home health orders  Disposition:   Home    Planned Readmission: no     Discharge Statement:  I spent 45 minutes discharging the patient  This time was spent on the day of discharge  I had direct contact with the patient on the day of discharge  Greater than 50% of the total time was spent examining patient, answering all patient questions, arranging and discussing plan of care with patient as well as directly providing post-discharge instructions  Additional time then spent on discharge activities  Discharge Medications:  See after visit summary for reconciled discharge medications provided to patient and/or family        **Please Note: This note may have been constructed using a voice recognition system**

## 2021-09-24 NOTE — SPEECH THERAPY NOTE
Speech/Language Pathology Note    Patient Name: Arlet Love  JQREE'I Date: 9/24/2021     Consult received and chart reviewed  Per chart review and discussion with RN, pt passed RN dysphagia assessment and is tolerating regular diet with thin liquids with no s/s of aspiration  Speech/Language deficits also denied  Formal speech/swallow evaluation does not appear to be indicated at this time  If new concerns arise, please reconsult  Thank you

## 2021-09-24 NOTE — ASSESSMENT & PLAN NOTE
Lab Results   Component Value Date    HGBA1C 10 8 (H) 09/23/2021     Lantus prescribed at 60 units daily per endocrinology  Patient admits he adjusts Lantus day to day based on his blood glucose levels  Utilized Fingerstick glucose 4 times daily AC/HS, Sliding scale insulin PRN  Fasting BG was well controlled here while observed on diabetic diet  Suspect dietary noncompliance  Will need to keep a food diary, blood glucose diary to present to his PCP at his follow up visit  He needs close outpatient follow up with endocrinologist as well  Would benefit from formal dietician consult on discharge      (P) 190

## 2021-09-24 NOTE — ASSESSMENT & PLAN NOTE
· He presented to the ER with complaints of headache  Associated with nausea, vomiting and visual disturbances  · CT head had abnormal findings - Subcentimeter hyperdense lesion at the posterior right centrum semiovale   The findings are nonspecific with differential considerations including a cavernoma, hemorrhagic lesion, among other etiologies  In addition, there is a 2 cm round area of low-attenuation at the medial right occipital lobe raising the concern for neoplasm or ischemia   No prior studies are available for comparison   An enhanced MRI of the brain is recommended for further evaluation  · ER attending discussed case with neurology  Recommendation given to admit with follow up MRI for further detail  · MRI brain 9/23/21 -  Recent infarcts are seen in the right occipital lobe, and tiny lacunar infarct right medial thalamus  No evidence of hemorrhage  · Further stroke workup / treatment:  · Stroke protocol followed with frequent VS, neuro checks  · Telemetry monitoring: stable,  No signs of atrial fibrillation overnight  Recommend loop recorder on discharge to further assess for paroxysmal atrial fibrillation  · Echocardiogram - unremarkable, EF 96%, mild diastolic dysfunction noted  · Anti-platelet: Patient received aspirin 325mg x 1 dose, to be continued at 81mg daily  Neurology also recommending dual anti-platelet therapy with addition of Plavix on discharge for 3 weeks, then transition to aspirin alone thereafter  · Hypertension: Permissive hypertension allowed initially  Will need to consider adjustment of anti-hypertensive regimen as needed  · Hyperlipidemia: LDL of 179 noted  Patient was started on Lipitor 40mg daily during this hospitalization  Previously on crestor, but has not been compliant with this medication  · Uncontrolled DM2: patient has dietary noncompliance  Will need to continue current insulin regimen with Lantus, metformin as recommended at recent endocrinology visit  keep a food diary and strict BG diary with further outpatient adjustments as needed  · OT, PT evaluation - no need for PT/OT services, safe discharge home  · D/w Neurology  Will coordinate outpatient follow up with patient

## 2021-09-27 NOTE — UTILIZATION REVIEW
Karleen Aase, PA-C   Physician Assistant   Hospitalist   Discharge Summary       Attested Addendum   Date of Service:  9/24/2021 11:27 AM               Attestation signed by Yamile Sena MD at 9/26/2021 7:15 AM   Co-Sign Only (Services provided by AP)     I was the supervising/collaborating physician on and I acknowledge the AP's documentation and services provided  I was available by phone, if needed, for consultation                Show:Clear all  [x]Manual[x]Template[x]Copied    Added by:  [x]Bridgett Rehman    []Veto for details  [address] [place]  Discharge- Keven Roy 1965, 60 y o  male MRN: [id number]  Unit/Bed#: 405-01 Encounter: [id number]  Primary Care Provider: No primary care provider on file  Date and time admitted to hospital: 9/22/2021  8:31 PM     * CVA (cerebrovascular accident) [facility])  Assessment & Plan  · He presented to the ER with complaints of headache  Associated with nausea, vomiting and visual disturbances  · CT head had abnormal findings - Subcentimeter hyperdense lesion at the posterior right centrum semiovale   The findings are nonspecific with differential considerations including a cavernoma, hemorrhagic lesion, among other etiologies  In addition, there is a 2 cm round area of low-attenuation at the medial right occipital lobe raising the concern for neoplasm or ischemia   No prior studies are available for comparison   An enhanced MRI of the brain is recommended for further evaluation  · ER attending discussed case with neurology  Recommendation given to admit with follow up MRI for further detail  · MRI brain 9/23/21 -  Recent infarcts are seen in the right occipital lobe, and tiny lacunar infarct right medial thalamus   No evidence of hemorrhage        · Further stroke workup / treatment:  ? Stroke protocol followed with frequent VS, neuro checks  ? Telemetry monitoring: stable,  No signs of atrial fibrillation overnight   Recommend loop recorder on discharge to further assess for paroxysmal atrial fibrillation  ? Echocardiogram - unremarkable, EF 44%, mild diastolic dysfunction noted  ? Anti-platelet: Patient received aspirin 325mg x 1 dose, to be continued at 81mg daily  Neurology also recommending dual anti-platelet therapy with addition of Plavix on discharge for 3 weeks, then transition to aspirin alone thereafter  ? Hypertension: Permissive hypertension allowed initially  Will need to consider adjustment of anti-hypertensive regimen as needed  ? Hyperlipidemia: LDL of 179 noted  Patient was started on Lipitor 40mg daily during this hospitalization  Previously on crestor, but has not been compliant with this medication  ? Uncontrolled DM2: patient has dietary noncompliance  Will need to continue current insulin regimen with Lantus, metformin as recommended at recent endocrinology visit  keep a food diary and strict BG diary with further outpatient adjustments as needed       · OT, PT evaluation - no need for PT/OT services, safe discharge home  · D/w Neurology  Will coordinate outpatient follow up with patient            Type 2 diabetes mellitus with hyperglycemia, with long-term current use of insulin [facility])  Assessment & Plan        Lab Results   Component Value Date     HGBA1C 10 8 (H) 09/23/2021      Lantus prescribed at 60 units daily per endocrinology  Patient admits he adjusts Lantus day to day based on his blood glucose levels  Utilized Fingerstick glucose 4 times daily AC/HS, Sliding scale insulin PRN  Fasting BG was well controlled here while observed on diabetic diet  Suspect dietary noncompliance  Will need to keep a food diary, blood glucose diary to present to his PCP at his follow up visit  He needs close outpatient follow up with endocrinologist as well  Would benefit from formal dietician consult on discharge      (P) 190     Noncompliance  Assessment & Plan  Medications and dietary non-compliance     Patient notes he is not routinely taking his chronic medications  Ran out and has not followed up with his PCP within a few years  He does take Lantus but takes varying doses  "When my blood sugar is high I take more, when it's low I take less"  He seems understanding and motivated to be more compliant with medications and lifestyle changes in general    Recommended PCP follow up within 1 week         Essential hypertension  Assessment & Plan  He is prescribed Amlodipine and lisinopril but reports noncompliance - recommend strict adherence on discharge  Of note, permissive hypertension allowed once acute CVA identified  Will need tighter blood pressure control with close PCP follow up on discharge  Low salt diet recommended  /77 on the day of discharge      Leukocytosis  Assessment & Plan  WBC level at 15 78  No clear source of infection  Tested for COVID 19 with negative results  Blood cultures x 2 - no growth  Procalcitonin < 0 05      Hyponatremia  Assessment & Plan  Sodium level at 132 on admission, corrected to 134   resolved on 9/23 with IV fluid hydration      Headache, unspecified  Assessment & Plan  In the setting of acute stroke, now improved with better BP control, IV fluids                Medical Problems            Resolved Problems  Date Reviewed: 9/24/2021           None                  Discharging Physician / Practitioner: Raydell Babinski, PA-C  PCP: No primary care provider on file  Admission Date:       Admission Orders (From admission, onward)              Ordered          09/23/21 1732   Inpatient Admission  Once           09/23/21 0055   Place in Observation  Once                       Discharge Date: 09/24/21     Consultations During Hospital Stay:  · none     Procedures Performed:   · none     Significant Findings / Test Results:   CTA head and neck with and without contrast  Result Date: 9/23/2021  Impression: No focal stenosis or saccular aneurysm within the [place]   No hemodynamically significant stenosis within either common or internal carotid artery  Less than 50% stenosis by NASCET criteria  Dominant left vertebral artery  Please refer to the report of a CT brain performed approximately 2 hours prior for additional details  Workstation performed: [id number]      X-ray chest 1 view portable  Result Date: 9/23/2021  Impression: No acute cardiopulmonary disease  Workstation performed: [id number]      CT head without contrast  Result Date: 9/22/2021  Impression: Subcentimeter hyperdense lesion at the posterior right centrum semiovale  The findings are nonspecific with differential considerations including a cavernoma, hemorrhagic lesion, among other etiologies  In addition, there is a 2 cm round area of low-attenuation at the medial right occipital lobe raising the concern for neoplasm or ischemia  No prior studies are available for comparison  An enhanced MRI of the brain is recommended for further evaluation  I personally discussed this study with Dr Emely Espino on 9/22/2021 at 10:39 PM  Workstation performed: [id number]         MRI brain w wo contrast  Result Date: 9/23/2021  Impression: Recent infarcts are seen in the right occipital lobe, and tiny lacunar infarct right medial thalamus  No evidence of hemorrhage  Workstation performed: [id number]         Echocardiogram Summary 9/24/21:  SUMMARY  LEFT VENTRICLE:  Systolic function was normal  Ejection fraction was estimated to be 60 %  There were no regional wall motion abnormalities  Doppler parameters were consistent with abnormal left ventricular relaxation (grade 1 diastolic dysfunction)    RIGHT VENTRICLE:  The size was normal   Systolic function was normal   MITRAL VALVE:  There was trace regurgitation    TRICUSPID VALVE:  There was trace regurgitation            Incidental Findings:   · none     Test Results Pending at Discharge (will require follow up):   · none     Outpatient Tests Requested:  · none     Complications: none     Reason for Admission:  Headache, abnormal CTA / CT head      Discharge diagnosis: Acute CVA     Hospital Course:   Terrence Sosa is a 60 y o  male patient who originally presented to the hospital on 9/22/2021 due to Headache (x2 day with tunnel vision  reports nausea and light sensitivity  tylenol last 9/21  denies head injury)  Please refer to admission H&P further details regarding the patient's initial presentation and management      The patients CT head / CTA head and neck noted some abnormal findings, prompting further admission for MRI brain  Unfortunately, acute CVA was noted  Workup included telemetry monitoring, echocardiogram, lipid panel, A1c, PT/OT evaluation, and close vital sign monitoring and neurologic checks  He was started on dual antiplatelet therapy with aspirin and plavix as well as statin therapy  He will need his uncontrolled diabetes more tightly managed, this is likely driven by dietary noncompliance, as well as some medication noncompliance  This was discussed with the patient at length  He reports motivation to improve his health status  Will need close follow up with PCP as well as neurology after discharge  Ambulatory referral sent to cardiology to help arrange for loop recorder on discharge         Please see above list of diagnoses and related plan for additional information       Condition at Discharge: good     Discharge Day Visit / Exam:   Subjective:  Patient is feeling well today, HA 80% improved  Vision changes, dizziness resolved  Good appetite  Feels well to go home  PT/OT cleared for discharge  Vitals: Blood Pressure: 132/77 (09/24/21 0712)  Pulse: 62 (09/24/21 0712)  Temperature: 98 1 °F (36 7 °C) (09/24/21 0712)  Temp Source: Oral (09/24/21 0301)  Respirations: 16 (09/24/21 0712)  Height: 5' 7" (170 2 cm) (09/23/21 2017)  Weight - Scale: 82 6 kg (182 lb 1 6 oz) (09/24/21 0600)  SpO2: 97 % (09/24/21 8749)  Exam:   Physical Exam  Vitals and nursing note reviewed  Constitutional:       General: He is not in acute distress  Appearance: Normal appearance  He is not ill-appearing or diaphoretic  HENT:      Head: Normocephalic  Mouth/Throat:      Mouth: Mucous membranes are moist    Cardiovascular:      Rate and Rhythm: Normal rate and regular rhythm  Heart sounds: No murmur heard  Pulmonary:      Effort: Pulmonary effort is normal       Breath sounds: Normal breath sounds  No wheezing  Musculoskeletal:      Right lower leg: No edema  Left lower leg: No edema  Skin:     General: Skin is warm and dry  Neurological:      General: No focal deficit present  Mental Status: He is alert  He is disoriented  Cranial Nerves: No cranial nerve deficit  Sensory: No sensory deficit  Motor: No weakness  Psychiatric:         Mood and Affect: Mood normal                   Discharge instructions/Information to patient and family:   See after visit summary for information provided to patient and family        Provisions for Follow-Up Care:  See after visit summary for information related to follow-up care and any pertinent home health orders  Disposition:   Home     Planned Readmission: no     Discharge Statement:  I spent 45 minutes discharging the patient  This time was spent on the day of discharge  I had direct contact with the patient on the day of discharge  Greater than 50% of the total time was spent examining patient, answering all patient questions, arranging and discussing plan of care with patient as well as directly providing post-discharge instructions    Additional time then spent on discharge activities      Discharge Medications:  See after visit summary for reconciled discharge medications provided to patient and/or family        **Please Note: This note may have been constructed using a voice recognition system**         Cosigned by: Alpa Baker MD at 9/26/2021  7:15 AM   Revision History

## 2021-09-28 LAB
BACTERIA BLD CULT: NORMAL
BACTERIA BLD CULT: NORMAL

## 2021-09-30 ENCOUNTER — TELEPHONE (OUTPATIENT)
Dept: NEUROLOGY | Facility: CLINIC | Age: 56
End: 2021-09-30

## 2021-09-30 NOTE — TELEPHONE ENCOUNTER
1st Attempt  Patient's wife left voicemail to schedule his hospital follow up  Returned patient's call and but unable to leave message due to voicemail box being full  SLMI/CVA/Verify Ins    Notes from chart:  Patrick Booth will need follow up in in 6 weeks with neurovascular attending or advance practitioner  He will not require outpatient neurological testing

## 2021-10-01 NOTE — TELEPHONE ENCOUNTER
Patient's wife called back and is scheduled for 11/03 with Varun Bryant in the [facility] office at 3:45pm

## 2021-10-25 ENCOUNTER — TELEPHONE (OUTPATIENT)
Dept: NEUROLOGY | Facility: CLINIC | Age: 56
End: 2021-10-25

## 2021-11-24 ENCOUNTER — TELEPHONE (OUTPATIENT)
Dept: NEUROLOGY | Facility: CLINIC | Age: 56
End: 2021-11-24

## 2021-11-26 ENCOUNTER — OFFICE VISIT (OUTPATIENT)
Dept: NEUROLOGY | Facility: CLINIC | Age: 56
End: 2021-11-26
Payer: COMMERCIAL

## 2021-11-26 ENCOUNTER — PREP FOR PROCEDURE (OUTPATIENT)
Dept: NEUROLOGY | Facility: CLINIC | Age: 56
End: 2021-11-26

## 2021-11-26 VITALS
HEART RATE: 82 BPM | WEIGHT: 186.8 LBS | SYSTOLIC BLOOD PRESSURE: 143 MMHG | HEIGHT: 67 IN | DIASTOLIC BLOOD PRESSURE: 80 MMHG | BODY MASS INDEX: 29.32 KG/M2 | TEMPERATURE: 96.6 F

## 2021-11-26 DIAGNOSIS — H53.9 VISUAL DISTURBANCE: ICD-10-CM

## 2021-11-26 DIAGNOSIS — R20.2 NUMBNESS AND TINGLING OF RIGHT LOWER EXTREMITY: ICD-10-CM

## 2021-11-26 DIAGNOSIS — I63.9 CEREBROVASCULAR ACCIDENT (CVA), UNSPECIFIED MECHANISM (HCC): Primary | ICD-10-CM

## 2021-11-26 DIAGNOSIS — R20.0 NUMBNESS AND TINGLING OF RIGHT LOWER EXTREMITY: ICD-10-CM

## 2021-11-26 DIAGNOSIS — R51.9 FREQUENT HEADACHES: ICD-10-CM

## 2021-11-26 PROCEDURE — 99214 OFFICE O/P EST MOD 30 MIN: CPT | Performed by: NURSE PRACTITIONER

## 2021-11-26 RX ORDER — ATORVASTATIN CALCIUM 40 MG/1
40 TABLET, FILM COATED ORAL EVERY EVENING
Qty: 30 TABLET | Refills: 6 | Status: SHIPPED | OUTPATIENT
Start: 2021-11-26

## 2021-11-26 RX ORDER — LISINOPRIL 10 MG/1
10 TABLET ORAL DAILY
Qty: 30 TABLET | Refills: 3 | Status: SHIPPED | OUTPATIENT
Start: 2021-11-26

## 2022-02-09 ENCOUNTER — TELEPHONE (OUTPATIENT)
Dept: CARDIOLOGY CLINIC | Facility: CLINIC | Age: 57
End: 2022-02-09

## 2022-02-22 ENCOUNTER — TELEPHONE (OUTPATIENT)
Dept: NEUROLOGY | Facility: CLINIC | Age: 57
End: 2022-02-22

## 2022-03-10 ENCOUNTER — OFFICE VISIT (OUTPATIENT)
Dept: URGENT CARE | Facility: CLINIC | Age: 57
End: 2022-03-10
Payer: COMMERCIAL

## 2022-03-10 VITALS
BODY MASS INDEX: 28.93 KG/M2 | RESPIRATION RATE: 18 BRPM | DIASTOLIC BLOOD PRESSURE: 86 MMHG | HEIGHT: 66 IN | SYSTOLIC BLOOD PRESSURE: 160 MMHG | HEART RATE: 107 BPM | WEIGHT: 180 LBS | TEMPERATURE: 98.3 F

## 2022-03-10 DIAGNOSIS — R07.9 CHEST PAIN, UNSPECIFIED TYPE: Primary | ICD-10-CM

## 2022-03-10 PROCEDURE — 99213 OFFICE O/P EST LOW 20 MIN: CPT

## 2022-03-10 PROCEDURE — 93005 ELECTROCARDIOGRAM TRACING: CPT

## 2022-03-10 NOTE — PROGRESS NOTES
[email or web address] [facility]        NAME: Deacon Cha is a 60 y o  male  : 1965    MRN: [id number]  DATE: March 10, 2022  TIME: 12:15 PM    Assessment and Plan   Chest pain, unspecified type [R07 9]  1  Chest pain, unspecified type  POCT ECG    Transfer to other facility     EKG shows sinus tachycardia rate 103  No ST elevations  Recommend ED evaluation for further work-up of symptoms  Patient drove himself  Offered ambulance transfer but he declined  States he's going to wait for his wife to get off work and then go to ER  Advised patient not to wait, discussed possible risks and adverse outcomes of delaying treatment  Patient verbalized understanding  Patient Instructions     Patient Instructions   Proceed to ER for further evaluation of your chest pain  Follow up with PCP in 3-5 days  Proceed to  ER if symptoms worsen  Chief Complaint     Chief Complaint   Patient presents with    Chest Pain     chest pain , left arm painon and off for 15 years  History of Present Illness     Deacon Cha is a 60 y o  male who presents today for evaluation of chest pain  He reports having chest pain on and off over the past 15 years but became worse since yesterday  He reports the pain woke him up last night and again this morning  Denies SOB, diaphoresis, N/V, headaches, dizziness, or arm numbness  Chest Pain   This is a recurrent problem  The current episode started yesterday  The onset quality is sudden  The problem occurs constantly  The problem has been waxing and waning  The pain is present in the substernal region  The pain is at a severity of 8/10  Quality: achey  The pain radiates to the left arm  Associated symptoms include palpitations  Pertinent negatives include no abdominal pain, back pain, cough, diaphoresis, dizziness, fever, headaches, leg pain, lower extremity edema, nausea, numbness, shortness of breath, syncope, vomiting or weakness  The pain is aggravated by nothing   He has tried nothing for the symptoms  Risk factors include alcohol intake and male gender  His past medical history is significant for diabetes, hyperlipidemia, hypertension and strokes  His family medical history is significant for heart disease  Review of Systems   Review of Systems   Constitutional: Negative for diaphoresis, fatigue and fever  HENT: Negative  Respiratory: Negative for cough, shortness of breath and wheezing  Cardiovascular: Positive for chest pain and palpitations  Negative for syncope  Gastrointestinal: Negative for abdominal pain, nausea and vomiting  Musculoskeletal: Negative for back pain  Skin: Negative for color change  Neurological: Negative for dizziness, syncope, weakness, numbness and headaches  Current Medications       Current Outpatient Medications:     aspirin 81 mg chewable tablet, Chew 1 tablet (81 mg total) daily, Disp: 30 tablet, Rfl: 0    atorvastatin (LIPITOR) 40 mg tablet, Take 1 tablet (40 mg total) by mouth every evening, Disp: 30 tablet, Rfl: 6    Lantus SoloStar 100 units/mL injection pen, INJECT 60 UNITS UNDER THE SKIN DAILY     (REFER TO PRESCRIPTION NOTES)  , Disp: , Rfl:     lisinopril (ZESTRIL) 10 mg tablet, Take 1 tablet (10 mg total) by mouth daily, Disp: 30 tablet, Rfl: 3    amLODIPine (NORVASC) 2 5 mg tablet, Take 2 5 mg by mouth daily (Patient not taking: Reported on 9/23/2021), Disp: , Rfl:     metFORMIN (GLUCOPHAGE) 1000 MG tablet, Take 1 tablet by mouth daily, Disp: , Rfl:     Current Allergies     Allergies as of 03/10/2022    (No Known Allergies)            The following portions of the patient's history were reviewed and updated as appropriate: allergies, current medications, past family history, past medical history, past social history, past surgical history and problem list      Past Medical History:   Diagnosis Date    Diabetes mellitus ([address] )     Hypertension        Past Surgical History: Procedure Laterality Date    CARDIAC SURGERY         History reviewed  No pertinent family history  Medications have been verified  Objective   /86   Pulse (!) 107   Temp 98 3 °F (36 8 °C)   Resp 18   Ht 5' 6" (1 676 m)   Wt 81 6 kg (180 lb)   BMI 29 05 kg/m²        Physical Exam     Physical Exam  Vitals and nursing note reviewed  Constitutional:       General: He is not in acute distress  Appearance: Normal appearance  He is well-developed  Comments: Patient lying on stretcher in NAD  Eyes:      Extraocular Movements: Extraocular movements intact  Pupils: Pupils are equal, round, and reactive to light  Cardiovascular:      Rate and Rhythm: Regular rhythm  Tachycardia present  Heart sounds: Normal heart sounds, S1 normal and S2 normal    Pulmonary:      Effort: Pulmonary effort is normal       Breath sounds: Normal breath sounds  No wheezing, rhonchi or rales  Abdominal:      General: Bowel sounds are normal       Palpations: Abdomen is soft  Tenderness: There is no abdominal tenderness  Musculoskeletal:      Cervical back: Normal range of motion and neck supple  Right lower leg: No edema  Left lower leg: No edema  Lymphadenopathy:      Cervical: No cervical adenopathy  Skin:     General: Skin is warm and dry  Capillary Refill: Capillary refill takes less than 2 seconds  Neurological:      General: No focal deficit present  Mental Status: He is alert  Psychiatric:         Behavior: Behavior normal  Behavior is cooperative

## 2022-03-12 LAB
ATRIAL RATE: 103 BPM
P AXIS: 64 DEGREES
PR INTERVAL: 142 MS
QRS AXIS: -1 DEGREES
QRSD INTERVAL: 86 MS
QT INTERVAL: 332 MS
QTC INTERVAL: 434 MS
T WAVE AXIS: 56 DEGREES
VENTRICULAR RATE: 103 BPM

## 2022-03-12 PROCEDURE — 93010 ELECTROCARDIOGRAM REPORT: CPT | Performed by: INTERNAL MEDICINE

## 2022-10-10 ENCOUNTER — TELEPHONE (OUTPATIENT)
Dept: NEUROLOGY | Facility: CLINIC | Age: 57
End: 2022-10-10

## 2022-10-10 NOTE — TELEPHONE ENCOUNTER
Called and left a voicemail for patient - Please call back to confirm upcoming appointment with [facility]  Provided patient with apt date, time and location  Informed patient that check in is at least 15 minutes prior to apt time

## 2022-10-19 ENCOUNTER — OFFICE VISIT (OUTPATIENT)
Dept: NEUROLOGY | Facility: CLINIC | Age: 57
End: 2022-10-19

## 2022-10-19 VITALS
TEMPERATURE: 97.1 F | BODY MASS INDEX: 30.86 KG/M2 | WEIGHT: 192 LBS | SYSTOLIC BLOOD PRESSURE: 184 MMHG | HEIGHT: 66 IN | RESPIRATION RATE: 16 BRPM | HEART RATE: 75 BPM | DIASTOLIC BLOOD PRESSURE: 93 MMHG

## 2022-10-19 DIAGNOSIS — Z63.8 POOR ATTACHMENT TO PRIMARY CAREGIVER: ICD-10-CM

## 2022-10-19 DIAGNOSIS — R07.9 INTERMITTENT CHEST PAIN: ICD-10-CM

## 2022-10-19 DIAGNOSIS — I63.9 CEREBROVASCULAR ACCIDENT (CVA), UNSPECIFIED MECHANISM (HCC): ICD-10-CM

## 2022-10-19 DIAGNOSIS — H53.2 DIPLOPIA: Primary | ICD-10-CM

## 2022-10-19 RX ORDER — LISINOPRIL 10 MG/1
10 TABLET ORAL DAILY
Qty: 30 TABLET | Refills: 3 | Status: SHIPPED | OUTPATIENT
Start: 2022-10-19

## 2022-10-19 RX ORDER — ATORVASTATIN CALCIUM 40 MG/1
40 TABLET, FILM COATED ORAL EVERY EVENING
Qty: 30 TABLET | Refills: 6 | Status: SHIPPED | OUTPATIENT
Start: 2022-10-19

## 2022-10-19 RX ORDER — PEN NEEDLE, DIABETIC 31 GX5/16"
NEEDLE, DISPOSABLE MISCELLANEOUS
COMMUNITY
Start: 2022-07-20

## 2022-10-19 SDOH — SOCIAL STABILITY - SOCIAL INSECURITY: OTHER SPECIFIED PROBLEMS RELATED TO PRIMARY SUPPORT GROUP: Z63.8

## 2022-10-19 NOTE — PATIENT INSTRUCTIONS
Please see the diabetic eye specialist the optometrist referred you to  Suggest you get repeat MRI brain and lab work; if sudden visual loss, weakness, numbness, or speech change go to the ED (other stroke symptoms listed below)  For the intermittent chest pain and the suggestion to get longer EKG monitoring/loop recorder I suggest you see a cardiologist  It would be very good for you to get a primary care physician you see on a regular basis close to your home  Please take your blood pressure/cholesterol medication more regularly- refills sent  Follow up in 4 months    Stroke   AMBULATORY CARE:   A stroke  happens when blood flow to part of the brain is interrupted  This can cause serious brain damage from a lack of oxygen  A stroke caused by a blood clot is called an ischemic stroke  A stroke caused by a burst or torn blood vessel is called an intracerebral hemorrhage, or a hemorrhagic stroke  When stroke symptoms go away completely within minutes to hours and do not cause damage, it is called a transient ischemic attack (TIA)  A TIA is a warning sign that you are at risk of soon having a stroke  Know the warning signs of a stroke: The words BE FAST can help you remember and recognize warning signs of a stroke:  B = Balance:  Sudden loss of balance    E = Eyes:  Loss of vision in one or both eyes    F = Face:  Face droops on one side    A = Arms:  Arm drops when both arms are raised    S = Speech:  Speech is slurred or sounds different    T = Time:  Time to get help immediately       Call your local emergency number (911 in the [address] [facility]) for any of the following: You have any of the following signs of a stroke:      Numbness or drooping on one side of your face     Weakness in an arm or leg    Confusion or difficulty speaking    Dizziness, a severe headache, or vision loss       You have a seizure  You have chest pain or shortness of breath      Seek care immediately if:   Your arm or leg feels warm, tender, and painful  It may look swollen and red  You have loss of balance or coordination  You have double vision or vision loss  You have unusual or heavy bleeding  Call your doctor or neurologist if:   Your blood sugar level or blood pressure is higher or lower than usual     You have trouble swallowing  You have trouble having a bowel movement or urinating  You have questions or concerns about your condition or care  Signs and symptoms  depend on the type of stroke you had and where it occurred:  Loss of consciousness    Loss of vision in one or both eyes    Sudden weakness or paralysis in your arm, leg, or face    Sudden trouble walking, speaking, or understanding words you hear or read    Vomiting or a severe headache    Treatment  depends on the type of stroke you had:  Medicines  may be given to prevent or break up blood clots, or help your blood clot more easily  You may also need medicines to treat high cholesterol, high blood pressure, or diabetes  Thrombolysis  is a procedure used to break apart clots in an artery  A catheter is guided into the artery until it is near the clot  Medicine is put through the catheter that will help break apart the clot  The clot may be pulled out of the artery  Surgery  may be used to remove a blood clot or to relieve pressure within your brain  You may also need surgery to remove plaque buildup from your carotid arteries  Recovery testing: Your healthcare provider will test your recovery 90 days (3 months) after your stroke  This may be done over the phone or in person  Your provider will ask how well you can do the activities you did before the stroke  He or she will also ask how well you can do your daily activities without help  Your provider may make recommendations for you based on your test  For example, you may need someone to help you walk safely  You may also need help with daily activities, such as getting dressed   Based on your answers, your provider may do this test again over time  Manage the effects of a stroke:   Go to stroke rehabilitation (rehab) if directed  Rehab is a program run by specialists who will help you recover abilities you may have lost  Specialists include physical, occupational, and speech therapists  Physical therapists help you gain strength or keep your balance  Occupational therapists teach you new ways to do daily activities  Your therapy may include movements for everyday activities  An example is being able to raise yourself from a chair  A speech therapist helps you improve your ability to talk and swallow  Make your home safe  Remove anything you might trip over  Tape electrical cords down  Keep paths clear throughout your home  Make sure your home is well lit  Put nonslip materials on surfaces that might be slippery  An example is your bathtub or shower floor  A cane or walker may help you keep your balance as you walk  Prevent another stroke:   Manage health conditions  A condition such as diabetes can increase your risk for a stroke  Control your blood sugar level if you have hyperglycemia or diabetes  Take your prescribed medicines and check your blood sugar level as directed  Check your blood pressure as directed  High blood pressure can increase your risk for a stroke  Follow your healthcare provider's directions for controlling your blood pressure  Do not use nicotine products or illegal drugs  Nicotine and other chemicals in cigarettes and cigars can cause blood vessel damage  Nicotine and illegal drugs both increase your risk for a stroke  Ask your healthcare provider for information if you currently smoke or use drugs and need help to quit  E- cigarettes or smokeless tobacco still contain nicotine  Talk to your healthcare provider before you use these products  Talk to your healthcare provider about alcohol  Alcohol can raise your blood pressure   The recommended limit is 2 drinks in a day for men and 1 drink in a day for women  Do not binge drink or save a week's worth of alcohol to drink in 1 or 2 days  Limit weekly amounts as directed by your provider  Eat a variety of healthy foods  Healthy foods include whole-grain breads, low-fat dairy products, beans, lean meats, and fish  Eat at least 5 servings of fruits and vegetables each day  Choose foods that are low in fat, cholesterol, salt, and sugar  Eat foods that are high in potassium, such as potatoes and bananas  A dietitian can help you create healthy meal plans  Maintain a healthy weight  Ask your healthcare provider how much you should weigh  Ask him or her to help you create a weight loss plan if you are overweight  He or she can help you create small goals if you have a lot of weight to lose  Exercise as directed  Exercise can lower your blood pressure, cholesterol, weight, and blood sugar levels  Healthcare providers will help you create exercise goals  They can also help you make a plan to reach your goals  For example, you can break exercise into 10 minute periods, 3 times in the day  Find an exercise that you enjoy  This will make it easier for you to reach your exercise goals  Manage stress  Stress can raise your blood pressure  Find new ways to relax, such as deep breathing or listening to music  What you need to know about depression after a stroke:  Talk to your healthcare provider if you have depression that continues or is getting worse  Your provider may be able to help treat your depression  Your provider can also recommend support groups for you to join  A support group is a place to talk with others who have had a stroke  It may also help to talk to friends and family members about how you are feeling   Tell your family and friends to let your healthcare provider know if they see any signs of depression:  Extreme sadness    Avoiding social interaction with family or friends    A lack of interest in things you once enjoyed    Irritability    Trouble sleeping    Low energy levels    A change in eating habits or sudden weight gain or loss    Follow up with your doctor or neurologist as directed: You may need to come in for regular tests of your brain function  Your medicines may also need to be checked  Write down your questions so you remember to ask them during your visits  For support and more information:   American Heart Association and American Stroke Association  [address] , [address] [facility]   Phone: 5- 190 - [phone]  Web Address: https://www strong [employer]/  [address] 2022 Information is for End User's use only and may not be sold, redistributed or otherwise used for commercial purposes  All illustrations and images included in CareNotes® are the copyrighted property of [employer]  or [address] [place] [address]  The above information is an  only  It is not intended as medical advice for individual conditions or treatments  Talk to your doctor, nurse or pharmacist before following any medical regimen to see if it is safe and effective for you

## 2022-10-19 NOTE — PROGRESS NOTES
Patient ID: Mary Zhang is a 59 y o  male  Assessment/Plan:  Patient Instructions:  Please see the diabetic eye specialist the optometrist referred you to  Suggest you get repeat MRI brain and lab work; if sudden visual loss, weakness, numbness, or speech change go to the ED (other stroke symptoms listed below)  For the intermittent chest pain and the suggestion to get longer EKG monitoring/loop recorder I suggest you see a cardiologist  It would be very good for you to get a primary care physician you see on a regular basis close to your home  Please take your blood pressure/cholesterol medication more regularly- refills sent  Follow up in 4 months    We spent time today discussing the importance blood pressure control  I urged him to be more regular with his medications and establish with a primary care physician  For the newer visual symptoms without other signs of stroke I will repeat MRI brain  We discussed the importance of urgent ED evaluation if any new stroke like symptoms  Diagnoses and all orders for this visit:    Diplopia  -     MRI brain without contrast; Future    Cerebrovascular accident (CVA), unspecified mechanism ([facility] )  Comments:  rule out embolic cause  will need to have loop recorder    Orders:  -     MRI brain without contrast; Future  -     lisinopril (ZESTRIL) 10 mg tablet; Take 1 tablet (10 mg total) by mouth daily  -     atorvastatin (LIPITOR) 40 mg tablet; Take 1 tablet (40 mg total) by mouth every evening  -     Ambulatory Referral to Cardiology; Future  -     CBC and differential; Future  -     Comprehensive metabolic panel; Future  -     Lipid Panel with Direct LDL reflex; Future    Cerebrovascular accident (CVA), unspecified mechanism ([facility] )  -     MRI brain without contrast; Future  -     lisinopril (ZESTRIL) 10 mg tablet; Take 1 tablet (10 mg total) by mouth daily  -     atorvastatin (LIPITOR) 40 mg tablet;  Take 1 tablet (40 mg total) by mouth every evening  -     Ambulatory Referral to Cardiology; Future  -     CBC and differential; Future  -     Comprehensive metabolic panel; Future  -     Lipid Panel with Direct LDL reflex; Future    Poor attachment to primary caregiver  -     Ambulatory referral to [facility]; Future    Intermittent chest pain  -     Ambulatory Referral to Cardiology; Future          Subjective:    HPI  John Pain presents for follow up of stroke- right pca 9/22/2021  Last office visit was 11/2021; it was recommended he see ophthalmology, establish with PCP and get loop recorder at last visit  He did see an optometrist - states went to [facility] in [place]-he states he was told his exam was ok- he needed glasses which he chooses not to wear regularly, but that he should see a retinal specialist and he has not yet made this appointment  He has not seen a primary care physician or cardiologist (no loop recorder or extended heart monitor completed), he has seen his endocrinologist recently and states his a1c poc was down to 8  He did have urgent care visit for chest pain in past (states it was severe/crushing at that time); he was encouraged to go to ED, but he did not because the pain went away  He tells me today he is compliant with his daily aspirin and lantus use but does not use lisinopril or lipitor regularly  He states he only rarely gets headaches and they are not severe  He does not check his blood pressure  He states since august he has had 3 episodes where he gets binocular diplopia for about 2-3 minutes at at time and then it will go away; he states one time it was vertical, other times it was horizontal  He does at other times get a blurred vision when looking at computer screen- this lasts more like 15-20min at a time  He denies any symptoms at this time  He states he is sleeping well; appetite is normal  He works long hours running a concrete plant; he states concerned because at times he works up high/on ladder      Previous History:  He had an abnormal CT (results below) suggesting lesion in right centrum semioval/right occipital lobe  Follow up CTA showed no significant stenosis or vascular concern  MRI showed the infarcts but no hemorrhage  EKG NSR  Echocardiogram - unremarkable, EF 22%, mild diastolic dysfunction noted  LDL was 179, A1c was 10 8  The recommendation was to do DAPT x3 weeks and then aspirin monotherapy as he was naive before  It was also recommended he get a loop recorder  He has family history of MI and DM in his father, and DM in his mother  The following portions of the patient's history were reviewed and updated as appropriate: allergies, current medications, past family history, past medical history, past social history, past surgical history and problem list          Objective:    Blood pressure (!) 184/93, pulse 75, temperature (!) 97 1 °F (36 2 °C), temperature source Temporal, resp  rate 16, height 5' 6" (1 676 m), weight 87 1 kg (192 lb)  Repeat left arm 180/89    Physical Exam  Vitals reviewed  Constitutional:       General: He is not in acute distress  Appearance: He is not ill-appearing, toxic-appearing or diaphoretic  HENT:      Head: Normocephalic and atraumatic  Right Ear: External ear normal       Left Ear: External ear normal       Mouth/Throat:      Mouth: Mucous membranes are moist       Pharynx: Oropharynx is clear  Eyes:      General:         Right eye: No discharge  Left eye: No discharge  Extraocular Movements: Extraocular movements intact  Conjunctiva/sclera: Conjunctivae normal       Pupils: Pupils are equal, round, and reactive to light  Comments: Mild left eye irritation   PERRLA/ EOMS intact in all directions, no double vision on exam today/no eye pain/tenderness/no temporal or jaw tenderness/ no VF deficit to CF   Cardiovascular:      Rate and Rhythm: Normal rate  Pulses: Normal pulses  Heart sounds: Normal heart sounds     Pulmonary:      Effort: Pulmonary effort is normal       Breath sounds: Normal breath sounds  Abdominal:      General: There is no distension  Musculoskeletal:      Cervical back: Normal range of motion  No rigidity or tenderness  Right lower leg: No edema  Left lower leg: No edema  Skin:     Capillary Refill: Capillary refill takes less than 2 seconds  Findings: No rash  Neurological:      General: No focal deficit present  Mental Status: He is alert  Coordination: Romberg sign negative  Deep Tendon Reflexes: Strength normal       Reflex Scores:       Brachioradialis reflexes are 1+ on the right side and 1+ on the left side  Patellar reflexes are 1+ on the right side and 1+ on the left side  Psychiatric:         Mood and Affect: Mood normal          Speech: Speech normal          Behavior: Behavior normal          Neurological Exam  Mental Status  Alert  Oriented to person, place and time  Speech is normal  Language is fluent with no aphasia  Cranial Nerves  CN II: Vision test: Mild left eye irritation   PERRLA/ EOMS intact in all directions, no double vision on exam today/no eye pain/tenderness/no temporal or jaw tenderness/ no VF deficit to CF  Right visual acuity: counts fingers  Left visual acuity: counts fingers  CN III, IV, VI: Extraocular movements intact bilaterally  Pupils equal round and reactive to light bilaterally  CN V: Facial sensation is normal   CN VII: Full and symmetric facial movement  CN VIII: Hearing is normal   CN IX, X: Palate elevates symmetrically  CN XI: Shoulder shrug strength is normal   CN XII: Tongue midline without atrophy or fasciculations  Motor  Normal muscle bulk throughout  Strength is 5/5 throughout all four extremities  Sensory  Light touch is normal in upper and lower extremities       Reflexes                                            Right                      Left  Brachioradialis                    1+                         1+  Patellar 1+                         1+    Coordination  Right: Finger-to-nose normal  Rapid alternating movement normal Left: Finger-to-nose normal  Rapid alternating movement normal     Gait  Casual gait is normal including stance, stride, and arm swing  Normal tandem gait  Romberg is absent  Able to rise from chair without using arms  ROS:    Review of Systems   Constitutional: Negative  Negative for appetite change and fever  HENT: Negative  Negative for hearing loss, tinnitus, trouble swallowing and voice change  Eyes: Negative  Negative for photophobia, pain and visual disturbance  Double vision   Respiratory: Negative  Negative for shortness of breath  Cardiovascular: Negative  Negative for palpitations  Gastrointestinal: Negative  Negative for nausea and vomiting  Endocrine: Negative  Negative for cold intolerance  Genitourinary: Negative  Negative for dysuria, frequency and urgency  Musculoskeletal: Negative  Negative for gait problem, myalgias and neck pain  Skin: Negative  Negative for rash  Allergic/Immunologic: Negative  Neurological: Positive for dizziness and light-headedness  Negative for tremors, seizures, syncope, facial asymmetry, speech difficulty, weakness, numbness and headaches  Hematological: Negative  Does not bruise/bleed easily  Psychiatric/Behavioral: Positive for confusion  Negative for hallucinations and sleep disturbance     ROS was reviewed and updated as appropriate

## 2022-10-26 ENCOUNTER — TELEPHONE (OUTPATIENT)
Dept: NEUROLOGY | Facility: CLINIC | Age: 57
End: 2022-10-26

## 2022-10-26 NOTE — TELEPHONE ENCOUNTER
Called patient to advise of Auth Approval for test, but has to contact [id number] for them to release Auth Approval #    Phone just rang and there was no voicemail set up to leave a message    Patient can contact [id number] [employer] phone # [phone] and press option for member    Patient can reference their Case # [id number]

## 2022-11-04 ENCOUNTER — HOSPITAL ENCOUNTER (OUTPATIENT)
Dept: MRI IMAGING | Facility: HOSPITAL | Age: 57
End: 2022-11-04

## 2022-11-04 DIAGNOSIS — I63.9 CEREBROVASCULAR ACCIDENT (CVA), UNSPECIFIED MECHANISM (HCC): ICD-10-CM

## 2022-11-04 DIAGNOSIS — H53.2 DIPLOPIA: ICD-10-CM

## 2022-11-09 ENCOUNTER — TELEPHONE (OUTPATIENT)
Dept: NEUROLOGY | Facility: CLINIC | Age: 57
End: 2022-11-09

## 2022-11-09 NOTE — TELEPHONE ENCOUNTER
pt's wife left  stating that pt had MRI and asking for a call back  [phone]    Called pt's wife dion back  Made her aware of below  AKOSUA Stern   11/7/2022  4:29 PM EST         No new stroke on MRI  It does show sinus disease  Follow up as scheduled

## 2022-11-25 ENCOUNTER — CONSULT (OUTPATIENT)
Dept: CARDIOLOGY CLINIC | Facility: HOSPITAL | Age: 57
End: 2022-11-25

## 2022-11-25 VITALS
DIASTOLIC BLOOD PRESSURE: 74 MMHG | SYSTOLIC BLOOD PRESSURE: 118 MMHG | HEART RATE: 74 BPM | HEIGHT: 66 IN | BODY MASS INDEX: 31.15 KG/M2 | WEIGHT: 193.8 LBS

## 2022-11-25 DIAGNOSIS — R07.9 INTERMITTENT CHEST PAIN: ICD-10-CM

## 2022-11-25 DIAGNOSIS — I63.9 CEREBROVASCULAR ACCIDENT (CVA), UNSPECIFIED MECHANISM (HCC): ICD-10-CM

## 2022-11-25 DIAGNOSIS — Z87.891 FORMER SMOKER: ICD-10-CM

## 2022-11-25 DIAGNOSIS — E11.65 TYPE 2 DIABETES MELLITUS WITH HYPERGLYCEMIA, WITH LONG-TERM CURRENT USE OF INSULIN (HCC): ICD-10-CM

## 2022-11-25 DIAGNOSIS — E78.00 HYPERCHOLESTEROLEMIA: ICD-10-CM

## 2022-11-25 DIAGNOSIS — I10 ESSENTIAL HYPERTENSION: Primary | ICD-10-CM

## 2022-11-25 DIAGNOSIS — Z91.199 NONCOMPLIANCE: ICD-10-CM

## 2022-11-25 DIAGNOSIS — Z79.4 TYPE 2 DIABETES MELLITUS WITH HYPERGLYCEMIA, WITH LONG-TERM CURRENT USE OF INSULIN (HCC): ICD-10-CM

## 2022-11-25 NOTE — PROGRESS NOTES
Consultation - Cardiology   Lenard Baker 59 y o  male MRN: [id number]  Unit/Bed#:  Encounter: [id number]  Physician Requesting Consult: No att  providers found  Reason for Consult / Principal Problem: Establish with [facility]    Assessment:  1  Previous CVAs  2  HTN  3  Atypical CP  4  DM  5  Hypercholesterolemia  6  Former smoker    Plan:  1  I stressed ti him that he must take his medications as prescribed and not return to smoking  2  He needs to establish with a PCP  3  I will schedule him for ILR implant  4  RTO 1 year  History of Present Illness     HPI: Lenard Baker is a 59y o  year old male who wishes to establish with [facility]  He has HTN, hypercholesterolemia, DM  He has finally made the effort to get these under control  He quit smoking a few years ago  He is now compliant taking his medications  His Hgb A1C is now 8 6  ( was near 12 )  /74  He is taking Lipitor 40 mg daily  ( LDL was 179 )    He does see neurology for his previous CVAs  They have recommended an ILR  Previous ECGs show SR  He is active  He has had atypical CP for several years now  Cardiac cath in 2019 showed non critical CAD          ECHO 2021 - normal EF, no valve disease        Review of Systems:    Alert awake oriented, comfortable, denies any complaints  No fevers chills nausea vomiting  No weakness, dizziness, seizures  no cough, shortness of breath, or wheezing  Denies any palpitations, chest pain, diaphoresis  Denies leg edema, pain or paresthesias  Denies any skin rashes  Denies abdominal pain, bloody stools, masses  Denies any depression or suicidal ideations      Historical Information   Past Medical History:   Diagnosis Date   • Diabetes mellitus ([facility] [address] )    • Hypertension      Past Surgical History:   Procedure Laterality Date   • CARDIAC SURGERY       Social History     Substance and Sexual Activity   Alcohol Use Yes    Comment: "a couple of beer per day"     Social History     Substance and Sexual Activity   Drug Use Never     Social History     Tobacco Use   Smoking Status Never   Smokeless Tobacco Never     Family History: non-contributory    Meds/Allergies   all current active meds have been reviewed  No Known Allergies    Objective   Vitals: Blood pressure 118/74, pulse 74, height 5' 6" (1 676 m), weight 87 9 kg (193 lb 12 8 oz)  , Body mass index is 31 28 kg/m²  ,   Weight (last 2 days)     Date/Time Weight    11/25/22 1310 87 9 (193 8)                    Physical Exam:  GEN: Yoni Austin appears well, alert and oriented x 3, pleasant and cooperative   HEENT: pupils equal, round, and reactive to light; extraocular muscles intact  NECK: supple, no carotid bruits   HEART: regular rhythm, normal S1 and S2, no murmurs, clicks, gallops or rubs   LUNGS: clear to auscultation bilaterally; no wheezes, rales, or rhonchi   ABDOMEN: normal bowel sounds, soft, no tenderness, no distention  EXTREMITIES: peripheral pulses normal; no clubbing, cyanosis, or edema  NEURO: no focal findings   SKIN: normal without suspicious lesions on exposed skin    Lab Results:   No visits with results within 1 Day(s) from this visit  Latest known visit with results is:   Office Visit on 03/10/2022   Component Date Value Ref Range Status   • Ventricular Rate 03/10/2022 103  BPM Final   • Atrial Rate 03/10/2022 103  BPM Final   • IA Interval 03/10/2022 142  ms Final   • QRSD Interval 03/10/2022 86  ms Final   • QT Interval 03/10/2022 332  ms Final   • QTC Interval 03/10/2022 434  ms Final   • P Axis 03/10/2022 64  degrees Final   • QRS Axis 03/10/2022 -1  degrees Final   • T Wave Axis 03/10/2022 56  degrees Final                       Imaging: I have personally reviewed pertinent reports

## 2023-01-17 ENCOUNTER — TELEPHONE (OUTPATIENT)
Dept: NEUROLOGY | Facility: CLINIC | Age: 58
End: 2023-01-17

## 2023-01-17 NOTE — TELEPHONE ENCOUNTER
Received VM transcription from [phone]:    Calling for Rohith kimberly. Birth date, 1965. Please give me a call back. Thank you.

## 2023-02-16 ENCOUNTER — TELEPHONE (OUTPATIENT)
Dept: NEUROLOGY | Facility: CLINIC | Age: 58
End: 2023-02-16

## 2023-02-16 NOTE — TELEPHONE ENCOUNTER
Called and left a voicemail for patient - Please call back to confirm upcoming appointment with [facility]  Provided patient with apt date, time and location  Informed patient that check in is at least 15 minutes prior to apt time  Also advised of missing bloodwork

## 2023-05-15 DIAGNOSIS — I63.9 CEREBROVASCULAR ACCIDENT (CVA), UNSPECIFIED MECHANISM (HCC): ICD-10-CM

## 2023-05-15 RX ORDER — LISINOPRIL 10 MG/1
10 TABLET ORAL DAILY
Qty: 30 TABLET | Refills: 1 | Status: SHIPPED | OUTPATIENT
Start: 2023-05-15

## 2023-05-15 NOTE — TELEPHONE ENCOUNTER
Received fax from [facility] requesting refill of lisinopril     Order pended below, please review and sign if agreeable

## 2023-10-14 ENCOUNTER — APPOINTMENT (EMERGENCY)
Dept: CT IMAGING | Facility: HOSPITAL | Age: 58
DRG: 299 | End: 2023-10-14
Payer: COMMERCIAL

## 2023-10-14 ENCOUNTER — APPOINTMENT (EMERGENCY)
Dept: NON INVASIVE DIAGNOSTICS | Facility: HOSPITAL | Age: 58
DRG: 299 | End: 2023-10-14
Payer: COMMERCIAL

## 2023-10-14 ENCOUNTER — APPOINTMENT (EMERGENCY)
Dept: RADIOLOGY | Facility: HOSPITAL | Age: 58
DRG: 299 | End: 2023-10-14
Payer: COMMERCIAL

## 2023-10-14 ENCOUNTER — OFFICE VISIT (OUTPATIENT)
Dept: URGENT CARE | Facility: CLINIC | Age: 58
End: 2023-10-14
Payer: COMMERCIAL

## 2023-10-14 ENCOUNTER — HOSPITAL ENCOUNTER (INPATIENT)
Facility: HOSPITAL | Age: 58
LOS: 2 days | Discharge: HOME | DRG: 299 | End: 2023-10-16
Attending: EMERGENCY MEDICINE | Admitting: INTERNAL MEDICINE
Payer: COMMERCIAL

## 2023-10-14 VITALS
RESPIRATION RATE: 18 BRPM | BODY MASS INDEX: 29.73 KG/M2 | SYSTOLIC BLOOD PRESSURE: 172 MMHG | TEMPERATURE: 98 F | OXYGEN SATURATION: 95 % | HEIGHT: 66 IN | DIASTOLIC BLOOD PRESSURE: 86 MMHG | WEIGHT: 185 LBS | HEART RATE: 107 BPM

## 2023-10-14 DIAGNOSIS — I26.99 PULMONARY EMBOLISM (HCC): ICD-10-CM

## 2023-10-14 DIAGNOSIS — E11.65 TYPE 2 DIABETES MELLITUS WITH HYPERGLYCEMIA, WITH LONG-TERM CURRENT USE OF INSULIN (HCC): ICD-10-CM

## 2023-10-14 DIAGNOSIS — D72.829 LEUKOCYTOSIS: ICD-10-CM

## 2023-10-14 DIAGNOSIS — I82.409 DVT (DEEP VENOUS THROMBOSIS) (HCC): Primary | ICD-10-CM

## 2023-10-14 DIAGNOSIS — Z79.4 TYPE 2 DIABETES MELLITUS WITH HYPERGLYCEMIA, WITH LONG-TERM CURRENT USE OF INSULIN (HCC): ICD-10-CM

## 2023-10-14 DIAGNOSIS — M79.89 PAIN AND SWELLING OF RIGHT LOWER LEG: Primary | ICD-10-CM

## 2023-10-14 DIAGNOSIS — M79.661 PAIN AND SWELLING OF RIGHT LOWER LEG: Primary | ICD-10-CM

## 2023-10-14 DIAGNOSIS — R05.9 COUGH: ICD-10-CM

## 2023-10-14 PROBLEM — R94.39 ABNORMAL NUCLEAR STRESS TEST: Status: ACTIVE | Noted: 2019-01-22

## 2023-10-14 PROBLEM — R80.9 MICROALBUMINURIA: Status: ACTIVE | Noted: 2018-08-31

## 2023-10-14 PROBLEM — R03.0 ELEVATED BLOOD-PRESSURE READING WITHOUT DIAGNOSIS OF HYPERTENSION: Status: ACTIVE | Noted: 2018-06-06

## 2023-10-14 LAB
ANION GAP SERPL CALCULATED.3IONS-SCNC: 6 MMOL/L
APTT PPP: 27 SECONDS (ref 23–37)
BASOPHILS # BLD AUTO: 0.08 THOUSANDS/ÂΜL (ref 0–0.1)
BASOPHILS NFR BLD AUTO: 0 % (ref 0–1)
BNP SERPL-MCNC: 14 PG/ML (ref 0–100)
BUN SERPL-MCNC: 14 MG/DL (ref 5–25)
CALCIUM SERPL-MCNC: 9.8 MG/DL (ref 8.4–10.2)
CARDIAC TROPONIN I PNL SERPL HS: 8 NG/L
CHLORIDE SERPL-SCNC: 98 MMOL/L (ref 96–108)
CO2 SERPL-SCNC: 29 MMOL/L (ref 21–32)
CREAT SERPL-MCNC: 1.21 MG/DL (ref 0.6–1.3)
EOSINOPHIL # BLD AUTO: 0.55 THOUSAND/ÂΜL (ref 0–0.61)
EOSINOPHIL NFR BLD AUTO: 2 % (ref 0–6)
ERYTHROCYTE [DISTWIDTH] IN BLOOD BY AUTOMATED COUNT: 12.1 % (ref 11.6–15.1)
FLUAV RNA RESP QL NAA+PROBE: NEGATIVE
FLUBV RNA RESP QL NAA+PROBE: NEGATIVE
GFR SERPL CREATININE-BSD FRML MDRD: 64 ML/MIN/1.73SQ M
GLUCOSE SERPL-MCNC: 293 MG/DL (ref 65–140)
HCT VFR BLD AUTO: 44.2 % (ref 36.5–49.3)
HGB BLD-MCNC: 14.6 G/DL (ref 12–17)
IMM GRANULOCYTES # BLD AUTO: 0.09 THOUSAND/UL (ref 0–0.2)
IMM GRANULOCYTES NFR BLD AUTO: 0 % (ref 0–2)
INR PPP: 1.06 (ref 0.84–1.19)
LACTATE SERPL-SCNC: 1.1 MMOL/L (ref 0.5–2)
LYMPHOCYTES # BLD AUTO: 13.75 THOUSANDS/ÂΜL (ref 0.6–4.47)
LYMPHOCYTES NFR BLD AUTO: 60 % (ref 14–44)
MAGNESIUM SERPL-MCNC: 2 MG/DL (ref 1.9–2.7)
MCH RBC QN AUTO: 30.8 PG (ref 26.8–34.3)
MCHC RBC AUTO-ENTMCNC: 33 G/DL (ref 31.4–37.4)
MCV RBC AUTO: 93 FL (ref 82–98)
MONOCYTES # BLD AUTO: 0.91 THOUSAND/ÂΜL (ref 0.17–1.22)
MONOCYTES NFR BLD AUTO: 4 % (ref 4–12)
NEUTROPHILS # BLD AUTO: 7.79 THOUSANDS/ÂΜL (ref 1.85–7.62)
NEUTS SEG NFR BLD AUTO: 34 % (ref 43–75)
NRBC BLD AUTO-RTO: 0 /100 WBCS
PLATELET # BLD AUTO: 188 THOUSANDS/UL (ref 149–390)
PMV BLD AUTO: 9.2 FL (ref 8.9–12.7)
POTASSIUM SERPL-SCNC: 4.7 MMOL/L (ref 3.5–5.3)
PROCALCITONIN SERPL-MCNC: 0.05 NG/ML
PROTHROMBIN TIME: 14 SECONDS (ref 11.6–14.5)
RBC # BLD AUTO: 4.74 MILLION/UL (ref 3.88–5.62)
RSV RNA RESP QL NAA+PROBE: NEGATIVE
SARS-COV-2 RNA RESP QL NAA+PROBE: NEGATIVE
SODIUM SERPL-SCNC: 133 MMOL/L (ref 135–147)
WBC # BLD AUTO: 23.17 THOUSAND/UL (ref 4.31–10.16)

## 2023-10-14 PROCEDURE — 84145 PROCALCITONIN (PCT): CPT | Performed by: FAMILY MEDICINE

## 2023-10-14 PROCEDURE — 87040 BLOOD CULTURE FOR BACTERIA: CPT | Performed by: FAMILY MEDICINE

## 2023-10-14 PROCEDURE — 83735 ASSAY OF MAGNESIUM: CPT | Performed by: FAMILY MEDICINE

## 2023-10-14 PROCEDURE — 85610 PROTHROMBIN TIME: CPT | Performed by: FAMILY MEDICINE

## 2023-10-14 PROCEDURE — 36415 COLL VENOUS BLD VENIPUNCTURE: CPT | Performed by: FAMILY MEDICINE

## 2023-10-14 PROCEDURE — 84484 ASSAY OF TROPONIN QUANT: CPT | Performed by: FAMILY MEDICINE

## 2023-10-14 PROCEDURE — 83880 ASSAY OF NATRIURETIC PEPTIDE: CPT | Performed by: FAMILY MEDICINE

## 2023-10-14 PROCEDURE — 99284 EMERGENCY DEPT VISIT MOD MDM: CPT

## 2023-10-14 PROCEDURE — 73590 X-RAY EXAM OF LOWER LEG: CPT

## 2023-10-14 PROCEDURE — 74177 CT ABD & PELVIS W/CONTRAST: CPT

## 2023-10-14 PROCEDURE — 99213 OFFICE O/P EST LOW 20 MIN: CPT | Performed by: FAMILY MEDICINE

## 2023-10-14 PROCEDURE — 83036 HEMOGLOBIN GLYCOSYLATED A1C: CPT | Performed by: PHYSICIAN ASSISTANT

## 2023-10-14 PROCEDURE — 96366 THER/PROPH/DIAG IV INF ADDON: CPT

## 2023-10-14 PROCEDURE — 83605 ASSAY OF LACTIC ACID: CPT | Performed by: FAMILY MEDICINE

## 2023-10-14 PROCEDURE — 71260 CT THORAX DX C+: CPT

## 2023-10-14 PROCEDURE — 96361 HYDRATE IV INFUSION ADD-ON: CPT

## 2023-10-14 PROCEDURE — 80048 BASIC METABOLIC PNL TOTAL CA: CPT | Performed by: FAMILY MEDICINE

## 2023-10-14 PROCEDURE — 96365 THER/PROPH/DIAG IV INF INIT: CPT

## 2023-10-14 PROCEDURE — 85025 COMPLETE CBC W/AUTO DIFF WBC: CPT | Performed by: FAMILY MEDICINE

## 2023-10-14 PROCEDURE — 96375 TX/PRO/DX INJ NEW DRUG ADDON: CPT

## 2023-10-14 PROCEDURE — G1004 CDSM NDSC: HCPCS

## 2023-10-14 PROCEDURE — 93971 EXTREMITY STUDY: CPT

## 2023-10-14 PROCEDURE — 85730 THROMBOPLASTIN TIME PARTIAL: CPT | Performed by: FAMILY MEDICINE

## 2023-10-14 PROCEDURE — 0241U HB NFCT DS VIR RESP RNA 4 TRGT: CPT | Performed by: FAMILY MEDICINE

## 2023-10-14 RX ORDER — CEFEPIME HYDROCHLORIDE 2 G/50ML
2000 INJECTION, SOLUTION INTRAVENOUS ONCE
Status: COMPLETED | OUTPATIENT
Start: 2023-10-14 | End: 2023-10-14

## 2023-10-14 RX ORDER — HYDROCODONE BITARTRATE AND ACETAMINOPHEN 5; 325 MG/1; MG/1
1 TABLET ORAL EVERY 6 HOURS PRN
Status: DISCONTINUED | OUTPATIENT
Start: 2023-10-14 | End: 2023-10-16 | Stop reason: HOSPADM

## 2023-10-14 RX ORDER — HYDROMORPHONE HCL/PF 1 MG/ML
0.5 SYRINGE (ML) INJECTION ONCE
Status: COMPLETED | OUTPATIENT
Start: 2023-10-14 | End: 2023-10-14

## 2023-10-14 RX ORDER — HEPARIN SODIUM 10000 [USP'U]/100ML
3-30 INJECTION, SOLUTION INTRAVENOUS
Status: DISCONTINUED | OUTPATIENT
Start: 2023-10-14 | End: 2023-10-16

## 2023-10-14 RX ORDER — INSULIN LISPRO 100 [IU]/ML
1-6 INJECTION, SOLUTION INTRAVENOUS; SUBCUTANEOUS
Status: DISCONTINUED | OUTPATIENT
Start: 2023-10-14 | End: 2023-10-16 | Stop reason: HOSPADM

## 2023-10-14 RX ORDER — ASPIRIN 81 MG/1
81 TABLET, CHEWABLE ORAL DAILY
Status: DISCONTINUED | OUTPATIENT
Start: 2023-10-15 | End: 2023-10-16 | Stop reason: HOSPADM

## 2023-10-14 RX ORDER — AMLODIPINE BESYLATE 2.5 MG/1
5 TABLET ORAL DAILY
COMMUNITY

## 2023-10-14 RX ORDER — ACETAMINOPHEN 325 MG/1
650 TABLET ORAL EVERY 6 HOURS PRN
Status: DISCONTINUED | OUTPATIENT
Start: 2023-10-14 | End: 2023-10-16 | Stop reason: HOSPADM

## 2023-10-14 RX ORDER — LISINOPRIL 10 MG/1
10 TABLET ORAL DAILY
Status: DISCONTINUED | OUTPATIENT
Start: 2023-10-15 | End: 2023-10-16 | Stop reason: HOSPADM

## 2023-10-14 RX ORDER — INSULIN LISPRO 100 [IU]/ML
1-6 INJECTION, SOLUTION INTRAVENOUS; SUBCUTANEOUS
Status: DISCONTINUED | OUTPATIENT
Start: 2023-10-15 | End: 2023-10-16 | Stop reason: HOSPADM

## 2023-10-14 RX ORDER — INSULIN GLARGINE 100 [IU]/ML
60 INJECTION, SOLUTION SUBCUTANEOUS EVERY MORNING
Status: DISCONTINUED | OUTPATIENT
Start: 2023-10-15 | End: 2023-10-16 | Stop reason: HOSPADM

## 2023-10-14 RX ORDER — HEPARIN SODIUM 1000 [USP'U]/ML
6400 INJECTION, SOLUTION INTRAVENOUS; SUBCUTANEOUS ONCE
Status: COMPLETED | OUTPATIENT
Start: 2023-10-14 | End: 2023-10-14

## 2023-10-14 RX ORDER — ATORVASTATIN CALCIUM 40 MG/1
40 TABLET, FILM COATED ORAL EVERY EVENING
Status: DISCONTINUED | OUTPATIENT
Start: 2023-10-14 | End: 2023-10-16 | Stop reason: HOSPADM

## 2023-10-14 RX ORDER — ONDANSETRON 2 MG/ML
4 INJECTION INTRAMUSCULAR; INTRAVENOUS EVERY 6 HOURS PRN
Status: DISCONTINUED | OUTPATIENT
Start: 2023-10-14 | End: 2023-10-16 | Stop reason: HOSPADM

## 2023-10-14 RX ORDER — AMLODIPINE BESYLATE 5 MG/1
5 TABLET ORAL DAILY
Status: DISCONTINUED | OUTPATIENT
Start: 2023-10-15 | End: 2023-10-16 | Stop reason: HOSPADM

## 2023-10-14 RX ADMIN — SODIUM CHLORIDE 1000 ML: 0.9 INJECTION, SOLUTION INTRAVENOUS at 19:11

## 2023-10-14 RX ADMIN — HEPARIN SODIUM 6400 UNITS: 1000 INJECTION INTRAVENOUS; SUBCUTANEOUS at 22:39

## 2023-10-14 RX ADMIN — HYDROMORPHONE HYDROCHLORIDE 0.5 MG: 1 INJECTION, SOLUTION INTRAMUSCULAR; INTRAVENOUS; SUBCUTANEOUS at 22:30

## 2023-10-14 RX ADMIN — CEFEPIME HYDROCHLORIDE 2000 MG: 2 INJECTION, SOLUTION INTRAVENOUS at 19:15

## 2023-10-14 RX ADMIN — IOHEXOL 100 ML: 350 INJECTION, SOLUTION INTRAVENOUS at 19:48

## 2023-10-14 RX ADMIN — HEPARIN SODIUM 18 UNITS/KG/HR: 10000 INJECTION, SOLUTION INTRAVENOUS at 22:38

## 2023-10-14 NOTE — PROGRESS NOTES
[facility]        NAME: Nirmal Yang is a 59 y.o. male  : 1965    MRN: [id number]  DATE: 2023  TIME: 4:06 PM    Assessment and Plan   Pain and swelling of right lower leg [M79.661, M79.89]  1. Pain and swelling of right lower leg  Transfer to other facility        Called and spoke with Almaz Johnson at [facility] about patient    Patient Instructions     Patient declined ambulance, he would like to go via private vehicle to the [facility] Emergency Department for further evaluation, workup and treatment- hospital address verified with the patient. Patient agreed to go immediately to the ED. Chief Complaint     Chief Complaint   Patient presents with    Leg Pain     Right leg pain and swelling for 2 weeks, upper leg to heel         History of Present Illness       59-year-old male presents for evaluation of right leg pain and swelling worsening over the past 2 weeks. States it is from his mid thigh down to his right foot. Denies any injuries or trauma to the area. Denies any redness or warmth. States he has tried elevation with no improvement. States has never had anything like this before. He denies any history of blood clots. Denies a history of CHF. Denies any recent travel, long car rides or plane rides, recent surgeries, cancer history. He denies any weight gain, numbness, tingling, weakness, chest pain, chest tightness, palpitations, shortness of breath, GI/ symptoms or other complaints. Review of Systems   Review of Systems   Constitutional:  Negative for fatigue, fever and unexpected weight change. Respiratory:  Negative for cough, chest tightness, shortness of breath and wheezing. Cardiovascular:  Positive for leg swelling. Negative for chest pain and palpitations. Gastrointestinal:  Negative for abdominal pain, diarrhea, nausea and vomiting. Genitourinary:  Negative for decreased urine volume.    Musculoskeletal:  Positive for joint swelling and myalgias. Skin:  Negative for rash. Neurological:  Negative for dizziness, syncope, weakness, numbness and headaches. All other systems reviewed and are negative. Current Medications       Current Outpatient Medications:     amLODIPine (NORVASC) 2.5 mg tablet, Take 2.5 mg by mouth daily, Disp: , Rfl:     aspirin 81 mg chewable tablet, Chew 1 tablet (81 mg total) daily, Disp: 30 tablet, Rfl: 0    atorvastatin (LIPITOR) 40 mg tablet, Take 1 tablet (40 mg total) by mouth every evening, Disp: 30 tablet, Rfl: 6    Droplet Pen Needles 31G X 8 MM MISC, INJECT ONCE DAILY, Disp: , Rfl:     Lantus SoloStar 100 units/mL injection pen, INJECT 60 UNITS UNDER THE SKIN DAILY. ...  (REFER TO PRESCRIPTION NOTES). , Disp: , Rfl:     lisinopril (ZESTRIL) 10 mg tablet, Take 1 tablet (10 mg total) by mouth daily, Disp: 30 tablet, Rfl: 1    metFORMIN (GLUCOPHAGE) 1000 MG tablet, Take 1 tablet by mouth daily (Patient not taking: Reported on 10/14/2023), Disp: , Rfl:     Current Allergies     Allergies as of 10/14/2023    (No Known Allergies)            The following portions of the patient's history were reviewed and updated as appropriate: allergies, current medications, past family history, past medical history, past social history, past surgical history and problem list.     Past Medical History:   Diagnosis Date    Diabetes mellitus ([address])     Hypertension        Past Surgical History:   Procedure Laterality Date    CARDIAC SURGERY         No family history on file. Medications have been verified. Objective   BP (!) 172/86   Pulse (!) 107   Temp 98 °F (36.7 °C) (Temporal)   Resp 18   Ht 5' 6" (1.676 m)   Wt 83.9 kg (185 lb)   SpO2 95%   BMI 29.86 kg/m²        Physical Exam     Physical Exam  Vitals and nursing note reviewed. Constitutional:       General: He is not in acute distress. Appearance: He is well-developed. He is not toxic-appearing. Cardiovascular:      Rate and Rhythm: Regular rhythm. Tachycardia present. Heart sounds: Normal heart sounds. Pulmonary:      Effort: Pulmonary effort is normal. No respiratory distress. Breath sounds: Normal breath sounds. No wheezing. Musculoskeletal:      Right upper leg: Swelling and tenderness present. Right lower leg: Tenderness present. Edema present. Comments: Diffuse swelling and tenderness to palpation from right mid thigh through right calf/lower leg. Left calf measures 40 cm, right calf measures 43 cm. No erythema, warmth, drainage or signs of infection. Skin:     Capillary Refill: Capillary refill takes less than 2 seconds. Neurological:      Mental Status: He is alert and oriented to person, place, and time. Deep Tendon Reflexes: Reflexes are normal and symmetric. Comments: NV intact with sensation and strong peripheral pulses.  5/5 strength of LE bilaterally   Psychiatric:         Behavior: Behavior normal.

## 2023-10-14 NOTE — ED PROVIDER NOTES
History  Chief Complaint   Patient presents with    Leg Pain     Pt reports leg pain and swelling for 2 weeks. Leg Pain  Associated symptoms: no back pain and no fever    This is a 67-year-old male presented with complaint of right leg pain and swelling ongoing for 2 weeks. Patient also complaint of nonproductive cough for 2 to 3 months. Patient states initially started with pain and then started with the swelling. Rating his pain 5 out of 10. Denies any trauma or injury to his leg. Denies any recent surgery. Denies any recent travel. Otherwise awake alert oriented times GCS 15. Patient is denying any chest pain shortness of breath. Prior to Admission Medications   Prescriptions Last Dose Informant Patient Reported? Taking? Droplet Pen Needles 31G X 8 MM MISC   Yes No   Sig: INJECT ONCE DAILY   Lantus SoloStar 100 units/mL injection pen  Self Yes No   Sig: INJECT 60 UNITS UNDER THE SKIN DAILY. ...  (REFER TO PRESCRIPTION NOTES). amLODIPine (NORVASC) 2.5 mg tablet 10/14/2023  Yes Yes   Sig: Take 5 mg by mouth daily   aspirin 81 mg chewable tablet 10/14/2023 Self No Yes   Sig: Chew 1 tablet (81 mg total) daily   atorvastatin (LIPITOR) 40 mg tablet 10/14/2023  No Yes   Sig: Take 1 tablet (40 mg total) by mouth every evening   lisinopril (ZESTRIL) 10 mg tablet 10/14/2023  No Yes   Sig: Take 1 tablet (10 mg total) by mouth daily   metFORMIN (GLUCOPHAGE) 1000 MG tablet Not Taking Self Yes No   Sig: Take 1 tablet by mouth daily   Patient not taking: Reported on 10/14/2023      Facility-Administered Medications: None       Past Medical History:   Diagnosis Date    Diabetes mellitus ([address])     Hypertension        Past Surgical History:   Procedure Laterality Date    CARDIAC SURGERY         History reviewed. No pertinent family history. I have reviewed and agree with the history as documented.     E-Cigarette/Vaping    E-Cigarette Use Never User      E-Cigarette/Vaping Substances     Social History     Tobacco Use    Smoking status: Never    Smokeless tobacco: Never   Vaping Use    Vaping Use: Never used   Substance Use Topics    Alcohol use: Yes     Comment: "a couple of beer per day"    Drug use: Never       Review of Systems   Constitutional:  Negative for chills and fever. HENT:  Negative for rhinorrhea and sore throat. Eyes:  Negative for visual disturbance. Respiratory:  Negative for cough and shortness of breath. Cardiovascular:  Negative for chest pain and leg swelling. Gastrointestinal:  Negative for abdominal pain, diarrhea, nausea and vomiting. Genitourinary:  Negative for dysuria. Musculoskeletal:  Negative for back pain and myalgias. Right leg pain    Skin:  Negative for rash. Neurological:  Negative for dizziness and headaches. Psychiatric/Behavioral:  Negative for confusion. All other systems reviewed and are negative. Physical Exam  Physical Exam  Vitals and nursing note reviewed. Constitutional:       General: He is not in acute distress. Appearance: He is well-developed. He is not diaphoretic. HENT:      Head: Normocephalic and atraumatic. Right Ear: External ear normal.      Left Ear: External ear normal.      Nose: Nose normal.   Eyes:      Conjunctiva/sclera: Conjunctivae normal.      Pupils: Pupils are equal, round, and reactive to light. Cardiovascular:      Rate and Rhythm: Normal rate and regular rhythm. Pulmonary:      Effort: Pulmonary effort is normal. No respiratory distress. Breath sounds: Normal breath sounds. No wheezing. Abdominal:      General: Bowel sounds are normal. There is no distension. Palpations: Abdomen is soft. Tenderness: There is no abdominal tenderness. Musculoskeletal:         General: Swelling and tenderness (right leg: swollen adn tender. no discoloration noted) present. Cervical back: Normal range of motion and neck supple.    Lymphadenopathy:      Cervical: No cervical adenopathy. Skin:     General: Skin is warm and dry. Capillary Refill: Capillary refill takes less than 2 seconds. Neurological:      Mental Status: He is alert and oriented to person, place, and time.    Psychiatric:         Behavior: Behavior normal.         Vital Signs  ED Triage Vitals   Temperature Pulse Respirations Blood Pressure SpO2   10/14/23 1712 10/14/23 1712 10/14/23 1712 10/14/23 1715 10/14/23 1712   98.4 °F (36.9 °C) 94 18 (!) 190/89 94 %      Temp src Heart Rate Source Patient Position - Orthostatic VS BP Location FiO2 (%)   -- 10/14/23 1712 10/14/23 1712 10/14/23 1712 --    Monitor Sitting Right arm       Pain Score       10/14/23 1712       6           Vitals:    10/14/23 1712 10/14/23 1715 10/14/23 2145   BP:  (!) 190/89 (!) 175/84   Pulse: 94  88   Patient Position - Orthostatic VS: Sitting           Visual Acuity      ED Medications  Medications   heparin (porcine) 25,000 units in 0.45% NaCl 250 mL infusion (premix) (18 Units/kg/hr × 80 kg (Order-Specific) Intravenous New Bag 10/14/23 2238)   sodium chloride 0.9 % bolus 1,000 mL (1,000 mL Intravenous New Bag 10/14/23 1911)   cefepime (MAXIPIME) IVPB (premix in dextrose) 2,000 mg 50 mL (0 mg Intravenous Stopped 10/14/23 2149)   iohexol (OMNIPAQUE) 350 MG/ML injection (SINGLE-DOSE) 100 mL (100 mL Intravenous Given 10/14/23 1948)   heparin (porcine) injection 6,400 Units (6,400 Units Intravenous Given 10/14/23 2239)   HYDROmorphone (DILAUDID) injection 0.5 mg (0.5 mg Intravenous Given 10/14/23 2230)       Diagnostic Studies  Results Reviewed       Procedure Component Value Units Date/Time    APTT [[id number]]  (Normal) Collected: 10/14/23 2218    Lab Status: Final result Specimen: Blood from Arm, Right Updated: 10/14/23 2238     PTT 27 seconds     Protime-INR [[id number]]  (Normal) Collected: 10/14/23 2218    Lab Status: Final result Specimen: Blood from Arm, Right Updated: 10/14/23 2238     Protime 14.0 seconds      INR 1.06 HS Troponin 0hr (reflex protocol) [[id number]] Collected: 10/14/23 2218    Lab Status: In process Specimen: Blood from Arm, Right Updated: 10/14/23 2223    B-Type Natriuretic Peptide(BNP) [[id number]] Collected: 10/14/23 2218    Lab Status: In process Specimen: Blood from Arm, Right Updated: 10/14/23 2223    FLU/RSV/COVID - if FLU/RSV clinically relevant [[id number]]  (Normal) Collected: 10/14/23 1911    Lab Status: Final result Specimen: Nares from Nose Updated: 10/14/23 2014     SARS-CoV-2 Negative     INFLUENZA A PCR Negative     INFLUENZA B PCR Negative     RSV PCR Negative    Narrative:      FOR PEDIATRIC PATIENTS - copy/paste COVID Guidelines URL to browser: [email or web address]    SARS-CoV-2 assay is a Nucleic Acid Amplification assay intended for the  qualitative detection of nucleic acid from SARS-CoV-2 in nasopharyngeal  swabs. Results are for the presumptive identification of SARS-CoV-2 RNA. Positive results are indicative of infection with SARS-CoV-2, the virus  causing COVID-19, but do not rule out bacterial infection or co-infection  with other viruses. Laboratories within the [facility] and its  territories are required to report all positive results to the appropriate  public health authorities. Negative results do not preclude SARS-CoV-2  infection and should not be used as the sole basis for treatment or other  patient management decisions. Negative results must be combined with  clinical observations, patient history, and epidemiological information. This test has not been FDA cleared or approved. This test has been authorized by FDA under an Emergency Use Authorization  (EUA).  This test is only authorized for the duration of time the  declaration that circumstances exist justifying the authorization of the  emergency use of an in vitro diagnostic tests for detection of SARS-CoV-2  virus and/or diagnosis of COVID-19 infection under section 564(b)(1) of  the Act, 21 U. S.C. 731CEM-3(J)(5), unless the authorization is terminated  or revoked sooner. The test has been validated but independent review by FDA  and CLIA is pending. Test performed using [employer] GeneXpert: This RT-PCR assay targets N2,  a region unique to SARS-CoV-2. A conserved region in the E-gene was chosen  for pan-Sarbecovirus detection which includes SARS-CoV-2. According to CMS-2020-01-R, this platform meets the definition of high-throughput technology. Magnesium [[id number]]  (Normal) Collected: 10/14/23 1911    Lab Status: Final result Specimen: Blood from Arm, Left Updated: 10/14/23 1940     Magnesium 2.0 mg/dL     Lactic acid, plasma (w/reflex if result > 2.0) [[id number]]  (Normal) Collected: 10/14/23 1911    Lab Status: Final result Specimen: Blood from Arm, Left Updated: 10/14/23 1940     LACTIC ACID 1.1 mmol/L     Narrative:      Result may be elevated if tourniquet was used during collection. Procalcitonin [[id number]]  (Normal) Collected: 10/14/23 1838    Lab Status: Final result Specimen: Blood from Arm, Right Updated: 10/14/23 1927     Procalcitonin 0.05 ng/ml     Blood culture #1 [[id number]] Collected: 10/14/23 1911    Lab Status: In process Specimen: Blood from Arm, Left Updated: 10/14/23 1918    Blood culture #2 [[id number]] Collected: 10/14/23 1915    Lab Status:  In process Specimen: Blood from Arm, Right Updated: 10/14/23 7166    Basic metabolic panel [[id number]]  (Abnormal) Collected: 10/14/23 1838    Lab Status: Final result Specimen: Blood from Arm, Right Updated: 10/14/23 1901     Sodium 133 mmol/L      Potassium 4.7 mmol/L      Chloride 98 mmol/L      CO2 29 mmol/L      ANION GAP 6 mmol/L      BUN 14 mg/dL      Creatinine 1.21 mg/dL      Glucose 293 mg/dL      Calcium 9.8 mg/dL      eGFR 64 ml/min/1.73sq m     Narrative:      [place] guidelines for Chronic Kidney Disease (CKD):     Stage 1 with normal or high GFR (GFR > 90 mL/min/1.73 square meters)    Stage 2 Mild CKD (GFR = 60-89 mL/min/1.73 square meters)    Stage 3A Moderate CKD (GFR = 45-59 mL/min/1.73 square meters)    Stage 3B Moderate CKD (GFR = 30-44 mL/min/1.73 square meters)    Stage 4 Severe CKD (GFR = 15-29 mL/min/1.73 square meters)    Stage 5 End Stage CKD (GFR <15 mL/min/1.73 square meters)  Note: GFR calculation is accurate only with a steady state creatinine    CBC and differential [[id number]]  (Abnormal) Collected: 10/14/23 1838    Lab Status: Final result Specimen: Blood from Arm, Right Updated: 10/14/23 1844     WBC 23.17 Thousand/uL      RBC 4.74 Million/uL      Hemoglobin 14.6 g/dL      Hematocrit 44.2 %      MCV 93 fL      MCH 30.8 pg      MCHC 33.0 g/dL      RDW 12.1 %      MPV 9.2 fL      Platelets 288 Thousands/uL      nRBC 0 /100 WBCs      Neutrophils Relative 34 %      Immat GRANS % 0 %      Lymphocytes Relative 60 %      Monocytes Relative 4 %      Eosinophils Relative 2 %      Basophils Relative 0 %      Neutrophils Absolute 7.79 Thousands/µL      Immature Grans Absolute 0.09 Thousand/uL      Lymphocytes Absolute 13.75 Thousands/µL      Monocytes Absolute 0.91 Thousand/µL      Eosinophils Absolute 0.55 Thousand/µL      Basophils Absolute 0.08 Thousands/µL                    CT chest abdomen pelvis w contrast   Final Result by Za Anguiano MD (10/14 2222)   Addendum (preliminary) 1 of 1 by Za Anguiano MD (10/14 2222)   ADDENDUM:      Measured RV/LV ratio within normal limits. No CT evidence for right heart    strain. Clinical correlation recommended. Above findings communicated with Dr. Verna Gerard at 10:20 p.m. on 10/14/2023. Final      Suspect acute bilateral pulmonary emboli as above. No focal consolidation or effusion. No acute abnormality identified in the abdomen or pelvis.       Fracture involving the posterior ischial tuberosity on the right with partial callus formation suggesting subacute to chronic age although new from prior study dated 10/27/2020. Recommend clinical correlation. Above findings discussed with Dr. Tiffanie Patel at 9:30 p.m. on 10/14/2023.       Workstation performed: [id number]         VAS lower limb venous duplex study, unilateral/limited    (Results Pending)   XR tibia fibula 2 vw right    (Results Pending)              Procedures  CriticalCare Time    Date/Time: 10/14/2023 10:40 PM    Performed by: Natali Lane MD  Authorized by: Natali Lane MD    Critical care provider statement:     Critical care time (minutes):  200    Critical care start time:  10/14/2023 6:00 PM    Critical care end time:  10/14/2023 10:40 PM    Critical care time was exclusive of:  Separately billable procedures and treating other patients and teaching time    Critical care was necessary to treat or prevent imminent or life-threatening deterioration of the following conditions:  Respiratory failure and circulatory failure    Critical care was time spent personally by me on the following activities:  Blood draw for specimens, obtaining history from patient or surrogate, development of treatment plan with patient or surrogate, discussions with consultants, discussions with primary provider, evaluation of patient's response to treatment, examination of patient, review of old charts, re-evaluation of patient's condition, ordering and review of radiographic studies, ordering and review of laboratory studies, interpretation of cardiac output measurements and ordering and performing treatments and interventions    I assumed direction of critical care for this patient from another provider in my specialty: no             ED Course  ED Course as of 10/14/23 2245   Sat Oct 14, 2023   2142 [place] text Critical care waiting for call back    2224 Sounds good  Ok for slim and continue heparin  10:22 PM  20 days left     2242 ED5 I would get a troponin and BNP and ask the radiologist to see if they can determine the RV to LV ratio to give you an idea if the patient has right heart strain which will change your management   2242 ounds good  Ok for slim and continue heparin  10:22 PM  20 days left                               SBIRT 20yo+      Flowsheet Row Most Recent Value   Initial Alcohol Screen: US AUDIT-C     1. How often do you have a drink containing alcohol? 0 Filed at: 10/14/2023 1714   2. How many drinks containing alcohol do you have on a typical day you are drinking? 2 Filed at: 10/14/2023 1714   3a. Male UNDER 65: How often do you have five or more drinks on one occasion? 0 Filed at: 10/14/2023 1714   Audit-C Score 2 Filed at: 10/14/2023 1714   PATY: How many times in the past year have you. .. Used an illegal drug or used a prescription medication for non-medical reasons? Never Filed at: 10/14/2023 1714                      Medical Decision Making  This is a 78-year-old male presented to ED with complaint of right lower leg pain and swelling. History is taken from patient. Patient is awake alert and transfuse as 15. Differential diagnoses include DVT//tibial fracture/vascular injury/  Cellulitis  Plan we will obtain duplex and x-ray. Patient does not want anything for pain at this time. Duplex positive for DVT    [facility]-Vascular Tech-On Call ([facility]))  TAVARES GARCIA, your patient Marianna Burr in ED bed five is positive for acute partially occlusive DVT in his right femoral vein, popliteal vein, gastroc vein, posterior tibial vein, and peroneal veinS. Thanks  We will obtain lab CBC BMP to check for renal function then start patient on Eliquis. Labs reviewed the patient was found to have elevated WBC 23,000 complaining of nonproductive cough over 2 months concerning for infection like pneumonia/PE/pleural effusion will obtain further work-up and lactic acid blood culture CT chest abdomen pelvis we will continue to observe. Reviewed which shows elevated WBC. CT chest abdomen pelvis obtained.   Shows bilateral PE with no right heart strain. I personally discussed CT finding with radiologist who stated that there is no right heart strain moderate clot burden. Case discussed with pt agree with the plan. Discuss case with critical care on-call Rileyjennifer Hopelles states that patient is stable after reviewing the case and states that pt can be admitted under SLIM. Case discussed with Erik Dona  accepted the admission. Disposition: admit under SLIM . Pt is stable. Amount and/or Complexity of Data Reviewed  Labs: ordered. Radiology: ordered. Risk  Prescription drug management. Decision regarding hospitalization. Disposition  Final diagnoses:   DVT (deep venous thrombosis) (HCC)   Pulmonary embolism (HCC)   Leukocytosis   Cough     Time reflects when diagnosis was documented in both MDM as applicable and the Disposition within this note       Time User Action Codes Description Comment    10/14/2023  6:25 PM Markham Gold Add [I82.409] DVT (deep venous thrombosis) ([address])     10/14/2023 10:32 PM Markham Gold Add [I26.99] Pulmonary embolism ([address])     10/14/2023 10:32 PM Markham Gold Add [D72.829] Leukocytosis     10/14/2023 10:32 PM Markham Gold Add [R05.9] Cough           ED Disposition       ED Disposition   Admit    Condition   Stable    Date/Time   Sat Oct 14, 2023 2232    Comment     Case discussed withErik Doan the patient's admission status was agreed to be Admission Status: inpatient status to the service of Dr. Saintclair Herder  . Follow-up Information       Follow up With Specialties Details Why Contact Info    pcp  Schedule an appointment as soon as possible for a visit in 2 days If symptoms worsen             Patient's Medications   Discharge Prescriptions    APIXABAN (ELIQUIS) 5 MG    Take 2 tablets (10 mg total) by mouth 2 (two) times a day for 7 days, THEN 1 tablet (5 mg total) 2 (two) times a day for 23 days.        Start Date: 10/14/2023End Date: 11/13/2023       Order Dose: --       Quantity: 74 tablet    Refills: 0           PDMP Review         Value Time User    PDMP Reviewed  Yes 9/23/2021  1:46 AM Feng Smith PA-C            ED Provider  Electronically Signed by             Fausto Pierre MD  10/14/23 7838

## 2023-10-14 NOTE — PATIENT INSTRUCTIONS
Patient declined ambulance, he would like to go via private vehicle to the [facility] Emergency Department for further evaluation, workup and treatment- hospital address verified with the patient. Patient agreed to go immediately to the ED.

## 2023-10-15 PROBLEM — R09.89 SUSPECTED DVT (DEEP VEIN THROMBOSIS): Status: ACTIVE | Noted: 2023-10-15

## 2023-10-15 LAB
2HR DELTA HS TROPONIN: 0 NG/L
ANION GAP SERPL CALCULATED.3IONS-SCNC: 8 MMOL/L
APTT PPP: 52 SECONDS (ref 23–37)
APTT PPP: 56 SECONDS (ref 23–37)
APTT PPP: 65 SECONDS (ref 23–37)
BASOPHILS # BLD AUTO: 0.1 THOUSANDS/ÂΜL (ref 0–0.1)
BASOPHILS NFR BLD AUTO: 1 % (ref 0–1)
BUN SERPL-MCNC: 14 MG/DL (ref 5–25)
CALCIUM SERPL-MCNC: 8.8 MG/DL (ref 8.4–10.2)
CARDIAC TROPONIN I PNL SERPL HS: 8 NG/L
CHLORIDE SERPL-SCNC: 102 MMOL/L (ref 96–108)
CO2 SERPL-SCNC: 24 MMOL/L (ref 21–32)
CREAT SERPL-MCNC: 0.93 MG/DL (ref 0.6–1.3)
EOSINOPHIL # BLD AUTO: 0.57 THOUSAND/ÂΜL (ref 0–0.61)
EOSINOPHIL NFR BLD AUTO: 3 % (ref 0–6)
ERYTHROCYTE [DISTWIDTH] IN BLOOD BY AUTOMATED COUNT: 12.3 % (ref 11.6–15.1)
EST. AVERAGE GLUCOSE BLD GHB EST-MCNC: 263 MG/DL
GFR SERPL CREATININE-BSD FRML MDRD: 88 ML/MIN/1.73SQ M
GLUCOSE SERPL-MCNC: 137 MG/DL (ref 65–140)
GLUCOSE SERPL-MCNC: 140 MG/DL (ref 65–140)
GLUCOSE SERPL-MCNC: 179 MG/DL (ref 65–140)
GLUCOSE SERPL-MCNC: 205 MG/DL (ref 65–140)
GLUCOSE SERPL-MCNC: 231 MG/DL (ref 65–140)
GLUCOSE SERPL-MCNC: 269 MG/DL (ref 65–140)
HBA1C MFR BLD: 10.8 %
HCT VFR BLD AUTO: 40.8 % (ref 36.5–49.3)
HGB BLD-MCNC: 13.4 G/DL (ref 12–17)
IMM GRANULOCYTES # BLD AUTO: 0.09 THOUSAND/UL (ref 0–0.2)
IMM GRANULOCYTES NFR BLD AUTO: 0 % (ref 0–2)
LYMPHOCYTES # BLD AUTO: 13.12 THOUSANDS/ÂΜL (ref 0.6–4.47)
LYMPHOCYTES NFR BLD AUTO: 59 % (ref 14–44)
MCH RBC QN AUTO: 30.7 PG (ref 26.8–34.3)
MCHC RBC AUTO-ENTMCNC: 32.8 G/DL (ref 31.4–37.4)
MCV RBC AUTO: 94 FL (ref 82–98)
MONOCYTES # BLD AUTO: 1.14 THOUSAND/ÂΜL (ref 0.17–1.22)
MONOCYTES NFR BLD AUTO: 5 % (ref 4–12)
NEUTROPHILS # BLD AUTO: 7.12 THOUSANDS/ÂΜL (ref 1.85–7.62)
NEUTS SEG NFR BLD AUTO: 32 % (ref 43–75)
NRBC BLD AUTO-RTO: 0 /100 WBCS
PLATELET # BLD AUTO: 172 THOUSANDS/UL (ref 149–390)
PMV BLD AUTO: 9.6 FL (ref 8.9–12.7)
POTASSIUM SERPL-SCNC: 4.1 MMOL/L (ref 3.5–5.3)
RBC # BLD AUTO: 4.36 MILLION/UL (ref 3.88–5.62)
SODIUM SERPL-SCNC: 134 MMOL/L (ref 135–147)
WBC # BLD AUTO: 22.14 THOUSAND/UL (ref 4.31–10.16)

## 2023-10-15 PROCEDURE — 82948 REAGENT STRIP/BLOOD GLUCOSE: CPT

## 2023-10-15 PROCEDURE — 85730 THROMBOPLASTIN TIME PARTIAL: CPT | Performed by: INTERNAL MEDICINE

## 2023-10-15 PROCEDURE — 80048 BASIC METABOLIC PNL TOTAL CA: CPT | Performed by: PHYSICIAN ASSISTANT

## 2023-10-15 PROCEDURE — 99223 1ST HOSP IP/OBS HIGH 75: CPT | Performed by: INTERNAL MEDICINE

## 2023-10-15 PROCEDURE — 85025 COMPLETE CBC W/AUTO DIFF WBC: CPT | Performed by: PHYSICIAN ASSISTANT

## 2023-10-15 PROCEDURE — 93971 EXTREMITY STUDY: CPT | Performed by: SURGERY

## 2023-10-15 PROCEDURE — 85730 THROMBOPLASTIN TIME PARTIAL: CPT | Performed by: PHYSICIAN ASSISTANT

## 2023-10-15 PROCEDURE — 84484 ASSAY OF TROPONIN QUANT: CPT | Performed by: FAMILY MEDICINE

## 2023-10-15 RX ADMIN — INSULIN LISPRO 2 UNITS: 100 INJECTION, SOLUTION INTRAVENOUS; SUBCUTANEOUS at 07:55

## 2023-10-15 RX ADMIN — INSULIN LISPRO 3 UNITS: 100 INJECTION, SOLUTION INTRAVENOUS; SUBCUTANEOUS at 16:19

## 2023-10-15 RX ADMIN — AMLODIPINE BESYLATE 5 MG: 5 TABLET ORAL at 09:28

## 2023-10-15 RX ADMIN — INSULIN LISPRO 1 UNITS: 100 INJECTION, SOLUTION INTRAVENOUS; SUBCUTANEOUS at 11:38

## 2023-10-15 RX ADMIN — LISINOPRIL 10 MG: 10 TABLET ORAL at 09:28

## 2023-10-15 RX ADMIN — INSULIN GLARGINE 60 UNITS: 100 INJECTION, SOLUTION SUBCUTANEOUS at 09:28

## 2023-10-15 RX ADMIN — HEPARIN SODIUM 20 UNITS/KG/HR: 10000 INJECTION, SOLUTION INTRAVENOUS at 15:34

## 2023-10-15 RX ADMIN — INSULIN LISPRO 3 UNITS: 100 INJECTION, SOLUTION INTRAVENOUS; SUBCUTANEOUS at 21:09

## 2023-10-15 RX ADMIN — ATORVASTATIN CALCIUM 40 MG: 40 TABLET, FILM COATED ORAL at 17:37

## 2023-10-15 RX ADMIN — ASPIRIN 81 MG 81 MG: 81 TABLET ORAL at 09:28

## 2023-10-15 RX ADMIN — ATORVASTATIN CALCIUM 40 MG: 40 TABLET, FILM COATED ORAL at 00:26

## 2023-10-15 NOTE — ASSESSMENT & PLAN NOTE
Lab Results   Component Value Date    HGBA1C 10.8 (H) 09/23/2021       No results for input(s): "POCGLU" in the last 72 hours.     Blood Sugar Average: Last 72 hrs:    Placed on [facility] step 2 diet  Continue prehospital Lantus 60 units SQ every morning  Obtain Accu-Cheks before meals and at bedtime with Humalog correction dose before meals and at bedtime

## 2023-10-15 NOTE — ASSESSMENT & PLAN NOTE
Placed on level 2 stepdown  CT imaging revealed "Filling defects are seen within the distal right main pulmonary artery extending into segmental and subsegmental branches of the lower lobe. Additional filling defect seen within left lower lobe pulmonary arterial segmental and subsegmental branches  Case was discussed by ER provider with critical care on-call  Continue heparin drip  Obtain echocardiogram and venous duplex extended in a.m.   Placed on O2 and respiratory protocol  Give pain control as needed

## 2023-10-15 NOTE — H&P
[facility]  H&P  Name: Keila Lowe 59 y.o. male I MRN: [id number]  Unit/Bed#: ED 05 I Date of Admission: 10/14/2023   Date of Service: 10/15/2023 I Hospital Day: 1      Assessment/Plan   * Acute pulmonary embolism without acute cor pulmonale (HCC)  Assessment & Plan  Placed on level 2 stepdown  CT imaging revealed "Filling defects are seen within the distal right main pulmonary artery extending into segmental and subsegmental branches of the lower lobe. Additional filling defect seen within left lower lobe pulmonary arterial segmental and subsegmental branches  Case was discussed by ER provider with critical care on-call  Continue heparin drip  Obtain echocardiogram and venous duplex extended in a.m. Placed on O2 and respiratory protocol  Give pain control as needed    Suspected DVT (deep vein thrombosis)  Assessment & Plan  Continue heparin drip as per above  Obtain venous duplex extended in a.m. Hypercholesterolemia  Assessment & Plan  Prehospital Lipitor 40 mg p.o. daily    Essential hypertension  Assessment & Plan  Continue prehospital Norvasc 5 mg p.o. daily and lisinopril 10 mg p.o. daily    Type 2 diabetes mellitus with hyperglycemia, with long-term current use of insulin (HCC)  Assessment & Plan  Lab Results   Component Value Date    HGBA1C 10.8 (H) 09/23/2021       No results for input(s): "POCGLU" in the last 72 hours. Blood Sugar Average: Last 72 hrs:    Placed on [facility] step 2 diet  Continue prehospital Lantus 60 units SQ every morning  Obtain Accu-Cheks before meals and at bedtime with Humalog correction dose before meals and at bedtime           VTE Prophylaxis: Heparin Drip  Code Status: Level 1  POLST: There is no POLST form on file for this patient (pre-hospital)  Discussion with family: None present at bedside at time of exam    Anticipated Length of Stay:  Patient will be admitted on an Inpatient basis with an anticipated length of stay of  < 2 midnights. Justification for Hospital Stay: Acute pulmonary embolus requiring heparin drip and further evaluation    Chief Complaint:   Right leg pain and swelling x2 weeks    History of Present Illness:    Emmanuel Stallworth is a 59 y.o. male who presents with right leg pain and swelling x2 weeks. Patient presents ER for further evaluation treatment is 2-week history of right lower extremity swelling and pain. Patient denies any known trauma states that the swelling and pain have been steadily increasing over the past 2 weeks to the point where it is difficult for him to ambulate. Work-up while in ER revealed extensive bilateral pulmonary embolus and patient was subsequently started on heparin drip. Patient denies any chest pain, palpitations, shortness of breath. Review of Systems:  Review of Systems   Constitutional:  Negative for chills and fever. Respiratory:  Negative for cough, shortness of breath and wheezing. Cardiovascular:  Positive for leg swelling. Negative for chest pain and palpitations. Gastrointestinal:  Negative for abdominal pain, diarrhea, nausea and vomiting. Genitourinary:  Negative for dysuria, frequency, hematuria and urgency. Musculoskeletal:  Positive for gait problem and myalgias. Neurological:  Negative for weakness, light-headedness and headaches. Psychiatric/Behavioral:  Positive for suicidal ideas. Negative for hallucinations. All other systems reviewed and are negative. Past Medical and Surgical History:   Past Medical History:   Diagnosis Date    Diabetes mellitus ([address])     Hypertension        Past Surgical History:   Procedure Laterality Date    CARDIAC SURGERY         Meds/Allergies:  Prior to Admission medications    Medication Sig Start Date End Date Taking?  Authorizing Provider   amLODIPine (NORVASC) 2.5 mg tablet Take 5 mg by mouth daily   Yes Historical Provider, MD   apixaban (Eliquis) 5 mg Take 2 tablets (10 mg total) by mouth 2 (two) times a day for 7 days, THEN 1 tablet (5 mg total) 2 (two) times a day for 23 days. 10/14/23 11/13/23 Yes Jose Lerner MD   aspirin 81 mg chewable tablet Chew 1 tablet (81 mg total) daily 9/25/21  Yes Bridgett Moon PA-C   atorvastatin (LIPITOR) 40 mg tablet Take 1 tablet (40 mg total) by mouth every evening 10/19/22  Yes AKOSUA Artis   lisinopril (ZESTRIL) 10 mg tablet Take 1 tablet (10 mg total) by mouth daily 5/15/23  Yes AKOSUA Artis   Droplet Pen Needles 31G X 8 MM MISC INJECT ONCE DAILY 7/20/22   Historical Provider, MD Monae SoloStar 100 units/mL injection pen INJECT 60 UNITS UNDER THE SKIN DAILY. ...  (REFER TO PRESCRIPTION NOTES). 10/8/20   Historical Provider, MD   metFORMIN (GLUCOPHAGE) 1000 MG tablet Take 1 tablet by mouth daily  Patient not taking: Reported on 10/14/2023 7/30/21   Historical Provider, MD     I have reviewed home medications with patient personally. Allergies: No Known Allergies    Social History:  Marital Status: /Civil Union   Occupation: [employer]   Patient Pre-hospital Living Situation: Resides at home with wife  Patient Pre-hospital Level of Mobility: Full without assist  Patient Pre-hospital Diet Restrictions: Diabetic    Social History     Substance and Sexual Activity   Alcohol Use Yes    Comment: "a couple of beer per day"     Social History     Tobacco Use   Smoking Status Never   Smokeless Tobacco Never     Social History     Substance and Sexual Activity   Drug Use Never       Family History:  I have reviewed the patients family history    Physical Exam:   Vitals:   Blood Pressure: (!) 116/48 (10/14/23 2330)  Pulse: 88 (10/14/23 2330)  Temperature: 98.4 °F (36.9 °C) (10/14/23 1712)  Respirations: 18 (10/14/23 2330)  Height: 5' 6" (167.6 cm) (10/14/23 1712)  Weight - Scale: 83.9 kg (185 lb) (10/14/23 1712)  SpO2: 94 % (10/14/23 2330)    Physical Exam  Vitals and nursing note reviewed.    Constitutional:       General: He is not in acute distress. Appearance: Normal appearance. HENT:      Head: Normocephalic and atraumatic. Right Ear: Tympanic membrane normal.      Left Ear: Tympanic membrane normal.      Nose: Nose normal.      Mouth/Throat:      Mouth: Mucous membranes are moist.      Pharynx: No oropharyngeal exudate or posterior oropharyngeal erythema. Eyes:      Extraocular Movements: Extraocular movements intact. Pupils: Pupils are equal, round, and reactive to light. Cardiovascular:      Rate and Rhythm: Normal rate and regular rhythm. Pulses: Normal pulses. Heart sounds: Normal heart sounds. Pulmonary:      Effort: Pulmonary effort is normal. No respiratory distress. Breath sounds: Normal breath sounds. Abdominal:      General: Abdomen is flat. Bowel sounds are normal.      Palpations: Abdomen is soft. Musculoskeletal:         General: Tenderness present. Normal range of motion. Cervical back: Normal range of motion and neck supple. Right lower leg: Edema present. Left lower leg: No edema. Skin:     General: Skin is warm and dry. Capillary Refill: Capillary refill takes less than 2 seconds. Neurological:      General: No focal deficit present. Mental Status: He is alert and oriented to person, place, and time. Additional Data:   Lab Results: I have personally reviewed pertinent reports. Results from last 7 days   Lab Units 10/14/23  1838   WBC Thousand/uL 23.17*   HEMOGLOBIN g/dL 14.6   HEMATOCRIT % 44.2   PLATELETS Thousands/uL 188   NEUTROS PCT % 34*   LYMPHS PCT % 60*   MONOS PCT % 4   EOS PCT % 2     Results from last 7 days   Lab Units 10/14/23  1838   SODIUM mmol/L 133*   POTASSIUM mmol/L 4.7   CHLORIDE mmol/L 98   CO2 mmol/L 29   BUN mg/dL 14   CREATININE mg/dL 1.21   CALCIUM mg/dL 9.8     Results from last 7 days   Lab Units 10/14/23  2218   INR  1.06               Imaging: I have personally reviewed pertinent reports.     CT chest abdomen pelvis w contrast   Final Result by Brock Jovel MD (10/14 2222)   Addendum (preliminary) 1 of 1 by Brock Jovel MD (10/14 2222)   ADDENDUM:      Measured RV/LV ratio within normal limits. No CT evidence for right heart    strain. Clinical correlation recommended. Above findings communicated with Dr. Aurelio Braun at 10:20 p.m. on 10/14/2023. Final      Suspect acute bilateral pulmonary emboli as above. No focal consolidation or effusion. No acute abnormality identified in the abdomen or pelvis. Fracture involving the posterior ischial tuberosity on the right with partial callus formation suggesting subacute to chronic age although new from prior study dated 10/27/2020. Recommend clinical correlation. Above findings discussed with Dr. Jessie Olson at 9:30 p.m. on 10/14/2023. Workstation performed: [id number]         VAS lower limb venous duplex study, unilateral/limited    (Results Pending)   XR tibia fibula 2 vw right    (Results Pending)   VAS lower limb venous duplex study, complete bilateral    (Results Pending)       EKG, Pathology, and Other Studies Reviewed on Admission:   EKG: N/A    Epic Records Reviewed: Yes     ** Please Note: This note has been constructed using a voice recognition system.  **

## 2023-10-16 ENCOUNTER — APPOINTMENT (INPATIENT)
Dept: NON INVASIVE DIAGNOSTICS | Facility: HOSPITAL | Age: 58
DRG: 299 | End: 2023-10-16
Payer: COMMERCIAL

## 2023-10-16 VITALS
HEIGHT: 66 IN | DIASTOLIC BLOOD PRESSURE: 58 MMHG | HEART RATE: 88 BPM | RESPIRATION RATE: 32 BRPM | BODY MASS INDEX: 29.73 KG/M2 | SYSTOLIC BLOOD PRESSURE: 125 MMHG | OXYGEN SATURATION: 94 % | WEIGHT: 185 LBS | TEMPERATURE: 98.4 F

## 2023-10-16 PROBLEM — I82.401 DEEP VEIN THROMBOSIS (DVT) OF RIGHT LOWER EXTREMITY (HCC): Status: ACTIVE | Noted: 2023-10-15

## 2023-10-16 LAB
ANION GAP SERPL CALCULATED.3IONS-SCNC: 9 MMOL/L
AORTIC ROOT: 3.1 CM
APICAL FOUR CHAMBER EJECTION FRACTION: 63 %
APTT PPP: 68 SECONDS (ref 23–37)
APTT PPP: 78 SECONDS (ref 23–37)
BASOPHILS # BLD AUTO: 0.09 THOUSANDS/ÂΜL (ref 0–0.1)
BASOPHILS NFR BLD AUTO: 0 % (ref 0–1)
BUN SERPL-MCNC: 14 MG/DL (ref 5–25)
CALCIUM SERPL-MCNC: 8.5 MG/DL (ref 8.4–10.2)
CHLORIDE SERPL-SCNC: 104 MMOL/L (ref 96–108)
CO2 SERPL-SCNC: 24 MMOL/L (ref 21–32)
CREAT SERPL-MCNC: 0.9 MG/DL (ref 0.6–1.3)
E WAVE DECELERATION TIME: 244 MS
E/A RATIO: 0.82
EOSINOPHIL # BLD AUTO: 0.72 THOUSAND/ÂΜL (ref 0–0.61)
EOSINOPHIL NFR BLD AUTO: 3 % (ref 0–6)
ERYTHROCYTE [DISTWIDTH] IN BLOOD BY AUTOMATED COUNT: 12.3 % (ref 11.6–15.1)
FRACTIONAL SHORTENING: 24 (ref 28–44)
GFR SERPL CREATININE-BSD FRML MDRD: 92 ML/MIN/1.73SQ M
GLUCOSE SERPL-MCNC: 110 MG/DL (ref 65–140)
GLUCOSE SERPL-MCNC: 113 MG/DL (ref 65–140)
GLUCOSE SERPL-MCNC: 243 MG/DL (ref 65–140)
HCT VFR BLD AUTO: 40.5 % (ref 36.5–49.3)
HGB BLD-MCNC: 13.3 G/DL (ref 12–17)
IMM GRANULOCYTES # BLD AUTO: 0.09 THOUSAND/UL (ref 0–0.2)
IMM GRANULOCYTES NFR BLD AUTO: 0 % (ref 0–2)
INTERVENTRICULAR SEPTUM IN DIASTOLE (PARASTERNAL SHORT AXIS VIEW): 1 CM
INTERVENTRICULAR SEPTUM: 1 CM (ref 0.6–1.1)
LAAS-AP2: 17.3 CM2
LAAS-AP4: 18.9 CM2
LEFT ATRIUM SIZE: 3.7 CM
LEFT ATRIUM VOLUME (MOD BIPLANE): 57 ML
LEFT ATRIUM VOLUME INDEX (MOD BIPLANE): 29.4 ML/M2
LEFT INTERNAL DIMENSION IN SYSTOLE: 3.9 CM (ref 2.1–4)
LEFT VENTRICULAR INTERNAL DIMENSION IN DIASTOLE: 5.1 CM (ref 3.5–6)
LEFT VENTRICULAR POSTERIOR WALL IN END DIASTOLE: 1 CM
LEFT VENTRICULAR STROKE VOLUME: 59 ML
LVSV (TEICH): 59 ML
LYMPHOCYTES # BLD AUTO: 13.02 THOUSANDS/ÂΜL (ref 0.6–4.47)
LYMPHOCYTES NFR BLD AUTO: 62 % (ref 14–44)
MCH RBC QN AUTO: 31.6 PG (ref 26.8–34.3)
MCHC RBC AUTO-ENTMCNC: 32.8 G/DL (ref 31.4–37.4)
MCV RBC AUTO: 96 FL (ref 82–98)
MONOCYTES # BLD AUTO: 0.96 THOUSAND/ÂΜL (ref 0.17–1.22)
MONOCYTES NFR BLD AUTO: 5 % (ref 4–12)
MV E'TISSUE VEL-SEP: 7 CM/S
MV PEAK A VEL: 0.98 M/S
MV PEAK E VEL: 80 CM/S
MV STENOSIS PRESSURE HALF TIME: 71 MS
MV VALVE AREA P 1/2 METHOD: 3.1
NEUTROPHILS # BLD AUTO: 6.41 THOUSANDS/ÂΜL (ref 1.85–7.62)
NEUTS SEG NFR BLD AUTO: 30 % (ref 43–75)
NRBC BLD AUTO-RTO: 0 /100 WBCS
PLATELET # BLD AUTO: 211 THOUSANDS/UL (ref 149–390)
PMV BLD AUTO: 9.6 FL (ref 8.9–12.7)
POTASSIUM SERPL-SCNC: 4.4 MMOL/L (ref 3.5–5.3)
PROCALCITONIN SERPL-MCNC: 0.05 NG/ML
RBC # BLD AUTO: 4.21 MILLION/UL (ref 3.88–5.62)
RIGHT ATRIUM AREA SYSTOLE A4C: 16 CM2
RIGHT VENTRICLE ID DIMENSION: 2.5 CM
SL CV LEFT ATRIUM LENGTH A2C: 4.5 CM
SL CV LV EF: 60
SL CV PED ECHO LEFT VENTRICLE DIASTOLIC VOLUME (MOD BIPLANE) 2D: 124 ML
SL CV PED ECHO LEFT VENTRICLE SYSTOLIC VOLUME (MOD BIPLANE) 2D: 66 ML
SODIUM SERPL-SCNC: 137 MMOL/L (ref 135–147)
TRICUSPID ANNULAR PLANE SYSTOLIC EXCURSION: 2 CM
WBC # BLD AUTO: 21.29 THOUSAND/UL (ref 4.31–10.16)

## 2023-10-16 PROCEDURE — 82948 REAGENT STRIP/BLOOD GLUCOSE: CPT

## 2023-10-16 PROCEDURE — 93306 TTE W/DOPPLER COMPLETE: CPT

## 2023-10-16 PROCEDURE — 93306 TTE W/DOPPLER COMPLETE: CPT | Performed by: INTERNAL MEDICINE

## 2023-10-16 PROCEDURE — 85025 COMPLETE CBC W/AUTO DIFF WBC: CPT | Performed by: INTERNAL MEDICINE

## 2023-10-16 PROCEDURE — 99233 SBSQ HOSP IP/OBS HIGH 50: CPT | Performed by: NURSE PRACTITIONER

## 2023-10-16 PROCEDURE — 80048 BASIC METABOLIC PNL TOTAL CA: CPT | Performed by: INTERNAL MEDICINE

## 2023-10-16 PROCEDURE — 99239 HOSP IP/OBS DSCHRG MGMT >30: CPT | Performed by: NURSE PRACTITIONER

## 2023-10-16 PROCEDURE — 85730 THROMBOPLASTIN TIME PARTIAL: CPT | Performed by: PHYSICIAN ASSISTANT

## 2023-10-16 PROCEDURE — 84145 PROCALCITONIN (PCT): CPT | Performed by: INTERNAL MEDICINE

## 2023-10-16 RX ADMIN — INSULIN LISPRO 3 UNITS: 100 INJECTION, SOLUTION INTRAVENOUS; SUBCUTANEOUS at 11:38

## 2023-10-16 RX ADMIN — HEPARIN SODIUM 22 UNITS/KG/HR: 10000 INJECTION, SOLUTION INTRAVENOUS at 03:19

## 2023-10-16 RX ADMIN — ASPIRIN 81 MG 81 MG: 81 TABLET ORAL at 08:02

## 2023-10-16 RX ADMIN — LISINOPRIL 10 MG: 10 TABLET ORAL at 08:02

## 2023-10-16 RX ADMIN — AMLODIPINE BESYLATE 5 MG: 5 TABLET ORAL at 08:02

## 2023-10-16 RX ADMIN — INSULIN GLARGINE 60 UNITS: 100 INJECTION, SOLUTION SUBCUTANEOUS at 08:02

## 2023-10-16 RX ADMIN — APIXABAN 10 MG: 5 TABLET, FILM COATED ORAL at 12:51

## 2023-10-16 NOTE — NURSING NOTE
Discharge instructions and medications reviewed with patient prior to discharge home. Patient will follow up with PCP for medication management. Education reviewed on newly prescribed Eliquis.

## 2023-10-16 NOTE — ASSESSMENT & PLAN NOTE
WBCs 21.29  Afebrile  Viral swab negative  CT imaging without evidence for infection  Procalcitonin negative x 2  Per chart review, patient has had elevated white count greater than 12.9 the past 2 years per old records.   This could be in part reactive  Outpatient follow-up with PCP in 1 week for repeat labs  Return precautions given

## 2023-10-16 NOTE — ASSESSMENT & PLAN NOTE
Presented for right leg swelling and cough  CTA chest PE study 10/14/2023: Suspect acute bilateral pulmonary emboli    Case was discussed by ER provider with critical care on-call  Echocardiogram results reviewed  Continue heparin drip, plan to transition to Eliquis.   Reached out to case management for price check  Saturating well on room air  Pain control as needed

## 2023-10-16 NOTE — ASSESSMENT & PLAN NOTE
Lab Results   Component Value Date    HGBA1C 10.8 (H) 10/14/2023       Recent Labs     10/15/23  1100 10/15/23  1603 10/15/23  2037 10/16/23  0707   POCGLU 179* 231* 269* 113       Blood Sugar Average: Last 72 hrs:  (P) 189.5    Carb controlled diet  Continue prehospital Lantus 60 units SQ every morning  Fingerstick blood sugar with sliding scale coverage

## 2023-10-16 NOTE — DISCHARGE SUMMARY
[facility]  Discharge- Reford Gist 1965, 59 y.o. male MRN: [id number]  Unit/Bed#: ICU 02-01 Encounter: [id number]  Primary Care Provider: No primary care provider on file. Date and time admitted to hospital: 10/14/2023  5:23 PM    * Acute pulmonary embolism without acute cor pulmonale [facility])  Assessment & Plan  Presented for right leg swelling and cough  CTA chest PE study 10/14/2023: Suspect acute bilateral pulmonary emboli    Case was discussed by ER provider with critical care on-call  Echocardiogram results reviewed  Heparin drip, transitioned to Eliquis. Appreciate case management for price check  Saturating well on room air    Deep vein thrombosis (DVT) of right lower extremity [facility])  Assessment & Plan  Presented for right lower extremity swelling. Venous duplex 10/14/2023: There is evidence of acute partially occlusive deep vein thrombosis noted in the femoral, popliteal, gastrocnemius, posterior tibial, and peroneal veins in the right lower limb. Evaluation shows no evidence of thrombus in the common femoral vein in left lower limb  Appears to be unprovoked as patient describes himself is very active  Continue heparin infusion with plan to transition to Eliquis as above  Will give outpatient referral for hematology for possible hypercoagulability work-up    Leukocytosis  Assessment & Plan  WBCs 21.29  Afebrile  Viral swab negative  CT imaging without evidence for infection  Procalcitonin negative x 2  Per chart review, patient has had elevated white count greater than 12.9 the past 2 years per old records.   This could be in part reactive  Outpatient follow-up with PCP in 1 week for repeat labs  Return precautions given    Type 2 diabetes mellitus with hyperglycemia, with long-term current use of insulin [facility])  Assessment & Plan  Lab Results   Component Value Date    HGBA1C 10.8 (H) 10/14/2023       Recent Labs     10/15/23  1603 10/15/23  2037 10/16/23  0707 10/16/23  1100 POCGLU 231* 269* 113 243*         Blood Sugar Average: Last 72 hrs:  (P) 197.0527389764270205    Carb controlled diet  Continue prehospital Lantus 60 units SQ every morning  Fingerstick blood sugar with sliding scale coverage      Hypercholesterolemia  Assessment & Plan  Continue atorvastatin 40 mg PO daily at dinner    Essential hypertension  Assessment & Plan  BP reviewed  Continue amlodipine 5 mg PO daily and lisinopril 10 mg PO daily      Medical Problems       Resolved Problems  Date Reviewed: 10/16/2023   None       Discharging Physician / Practitioner: AKOSUA Acharya  PCP: No primary care provider on file. Admission Date:   Admission Orders (From admission, onward)       Ordered        10/14/23 2233  [address]  Once                          Discharge Date: 10/16/23    Significant Findings / Test Results:   X-ray right tib-fib 10/15/2023: No acute osseous abnormality  CT chest abdomen pelvis with contrast 10/14/2023: SPECT acute bilateral pulmonary emboli. No focal consolidation or effusion. No acute abnormality identified in the abdomen or pelvis. Fracture involving the posterior ischial tuberosity on the right with partial callus formation suggesting subacute to chronic age although new from prior study dated 77/13/9953     Complications:  None apparent    Reason for Admission: DVT/PE    Hospital Course:   Mirna Schwartz is a 59 y.o. male patient who originally presented to the hospital on 10/14/2023 due to right leg pain. X-ray of tib-fib was negative for osseous injury. Venous duplex revealed DVT in the right lower extremity. CT chest abdomen and pelvis evidence for acute bilateral pulmonary emboli. The patient was admitted to medicine and started on a heparin infusion. He was also noted to have leukocytosis, suspected to be reactive. An incidental finding on CT was subacute to chronic right ischial tuberosity fracture with callus formation.   Patient relates this to a fall several years ago. He was not experiencing symptoms with this during this hospitalization. Case management assisted with price check for Eliquis. He was given a 10 mg dose of Eliquis here and then discharged to home. He verbalized understanding of the medication instructions. He also understands that he is supposed to follow-up with hematology as an outpatient, areferral was given. He also requested a referral, and was given same , to [facility] as he has had difficulty making it to his appointments and [facility]. This is a brief discharge summary. Please see full patient chart for additional details. Condition at Discharge: stable    Discharge Day Visit / Exam:   * Please refer to separate progress note for these details *    Discussion with Family: Patient declined call to . Discharge instructions/Information to patient and family:   See after visit summary for information provided to patient and family. Provisions for Follow-Up Care:  See after visit summary for information related to follow-up care and any pertinent home health orders. Disposition:   Home    Planned Readmission:      Discharge Statement:  I spent 35 minutes discharging the patient. This time was spent on the day of discharge. I had direct contact with the patient on the day of discharge. Greater than 50% of the total time was spent examining patient, answering all patient questions, arranging and discussing plan of care with patient as well as directly providing post-discharge instructions. Additional time then spent on discharge activities. Discharge Medications:  See after visit summary for reconciled discharge medications provided to patient and/or family.       **Please Note: This note may have been constructed using a voice recognition system**

## 2023-10-16 NOTE — ASSESSMENT & PLAN NOTE
WBCs 21.29  Afebrile  Viral swab negative  CT imaging without evidence for infection  Procalcitonin negative x2  Per chart review, patient has had elevated white count greater than 12.9 the past 3years old records.   This could be in part reactive  Monitor labs and vital signs  Serial physical assessments

## 2023-10-16 NOTE — ASSESSMENT & PLAN NOTE
Presented for right leg swelling and cough  CTA chest PE study 10/14/2023: Suspect acute bilateral pulmonary emboli    Case was discussed by ER provider with critical care on-call  Echocardiogram results reviewed  Heparin drip, transitioned to Eliquis.   Appreciate case management for price check  Saturating well on room air

## 2023-10-16 NOTE — UTILIZATION REVIEW
Initial Clinical Review    Admission: Date/Time/Statement:   Admission Orders (From admission, onward)       Ordered        10/14/23 2233  INPATIENT ADMISSION  Once                          Orders Placed This Encounter   Procedures    INPATIENT ADMISSION     Standing Status:   Standing     Number of Occurrences:   1     Order Specific Question:   Level of Care     Answer:   Level 2 Stepdown / HOT [14]     Order Specific Question:   Estimated length of stay     Answer:   More than 2 Midnights     Order Specific Question:   Certification     Answer:   I certify that inpatient services are medically necessary for this patient for a duration of greater than two midnights. See H&P and MD Progress Notes for additional information about the patient's course of treatment. ED Arrival Information       Expected   10/14/2023 15:49    Arrival   10/14/2023 16:28    Acuity   Less Urgent              Means of arrival   Walk-In    Escorted by   Spouse    Service   Hospitalist    Admission type   Emergency              Arrival complaint   Pain and swelling of right lower leg             Chief Complaint   Patient presents with    Leg Pain     Pt reports leg pain and swelling for 2 weeks. Initial Presentation: 59 y.o. male who presented to [address] [place] [address] ED due to right leg pain and swelling x2 weeks. Denies any known trauma states that the swelling and pain have been steadily increasing over the past 2 weeks to the point where it is difficult for him to ambulate. Imaging in the ED revealed extensive bilateral pulmonary embolus and patient was subsequently started on heparin drip. PMHx:  DM, HTN. Plan:  Admit to Inpatient status dt BL PE, ICU, continue heparin drip, order echo and venous duplex, supplemental O2, pain control prn, start accuchecks w/ SSI. Date: 10/15   Day 2:   No new complaints today, LE edema noted. Continue above tx plan including heparin drip, O2, labs, fu on blood cxs, accuchecks.      ED Triage Vitals   Temperature Pulse Respirations Blood Pressure SpO2   10/14/23 1712 10/14/23 1712 10/14/23 1712 10/14/23 1715 10/14/23 1712   98.4 °F (36.9 °C) 94 18 (!) 190/89 94 %      Temp Source Heart Rate Source Patient Position - Orthostatic VS BP Location FiO2 (%)   10/15/23 0024 10/14/23 1712 10/14/23 1712 10/14/23 1712 --   Temporal Monitor Sitting Right arm       Pain Score       10/14/23 1712       6          Wt Readings from Last 1 Encounters:   10/16/23 83.9 kg (185 lb)     Additional Vital Signs:   Date/Time Temp Pulse Resp BP MAP (mmHg) SpO2 O2 Device Patient Position - Orthostatic VS   10/16/23 0900 -- 82 21 -- -- 94 % -- --   10/16/23 0800 -- 88 21 -- -- 93 % -- --   10/16/23 0731 -- 73 22 136/71 -- 92 % -- --   10/16/23 0700 98.3 °F (36.8 °C) 74 30 Abnormal  136/71 98 92 % None (Room air) Lying   10/16/23 0000 -- 71 21 116/56 81 91 % -- --   10/15/23 2000 98.3 °F (36.8 °C) -- -- -- -- -- -- --   10/15/23 1941 98.3 °F (36.8 °C) 78 25 Abnormal  143/71 100 93 % None (Room air) Lying   10/15/23 1545 -- -- -- -- -- 94 % None (Room air) --   10/15/23 1300 97.4 °F (36.3 °C) Abnormal  -- -- 125/58 83 -- None (Room air) Lying   10/15/23 1200 -- 107 Abnormal  62 Abnormal  -- -- 90 % -- --   10/15/23 1145 -- -- -- -- -- 94 % None (Room air) --   10/15/23 1000 97.8 °F (36.6 °C) 76 24 Abnormal  162/80 114 94 % None (Room air) Lying   10/15/23 0800 97.5 °F (36.4 °C) 86 20 157/74 107 93 % -- Sitting   10/15/23 0745 -- -- -- -- -- 94 % None (Room air) --   10/15/23 0600 -- 70 18 -- -- 90 % -- --   10/15/23 0400 -- 71 19 -- -- 92 % -- --   10/15/23 0200 -- 76 18 -- -- 89 % Abnormal  -- --   10/15/23 0024 99.9 °F (37.7 °C) 88 22 173/89 Abnormal  124 90 % None (Room air) Lying   10/14/23 2330 -- 88 18 116/48 Abnormal  68 94 % None (Room air) --   10/14/23 2145 -- 88 18 175/84 Abnormal  121 94 % None (Room air) --   10/14/23 1715 -- -- -- 190/89 Abnormal  -- -- -- --   10/14/23 1714 -- -- -- -- -- 94 % -- --   10/14/23 1712 98.4 °F (36.9 °C) 94 18 -- -- 94 % None (Room air) Sitting     Pertinent Labs/Diagnostic Test Results:   10/14 ECHO:    Left Ventricle: Left ventricular cavity size is normal. Wall thickness is normal. The left ventricular ejection fraction is 60%. Systolic function is normal. Wall motion is normal. Diastolic function is normal.    Right Ventricle: Systolic function is normal.    . Prior study date: 9/24/2021. No significant changes noted compared to the prior study. CT chest abdomen pelvis w contrast   Final Result by Edinson Lewis MD (10/14 2222)   Addendum (preliminary) 1 of 1 by Edinson Lewis MD (10/14 2222)   ADDENDUM:      Measured RV/LV ratio within normal limits. No CT evidence for right heart    strain. Clinical correlation recommended. Above findings communicated with Dr. Doris Arana at 10:20 p.m. on 10/14/2023. Final      Suspect acute bilateral pulmonary emboli as above. No focal consolidation or effusion. No acute abnormality identified in the abdomen or pelvis. Fracture involving the posterior ischial tuberosity on the right with partial callus formation suggesting subacute to chronic age although new from prior study dated 10/27/2020. Recommend clinical correlation. Above findings discussed with Dr. John Mai at 9:30 p.m. on 10/14/2023. Workstation performed: [id number]         VAS lower limb venous duplex study, unilateral/limited   Final Result by Mega Ricardo DO (10/15 1206)      XR tibia fibula 2 vw right   Final Result by Tawanda Noel MD (10/15 7516)      No acute osseous abnormality.             Workstation performed: [id number]         VAS lower limb venous duplex study, complete bilateral    (Results Pending)     Results from last 7 days   Lab Units 10/14/23  1911   SARS-COV-2  Negative     Results from last 7 days   Lab Units 10/16/23  0603 10/15/23  0446 10/14/23  1838   WBC Thousand/uL 21.29* 22.14* 23.17*   HEMOGLOBIN g/dL 13.3 13.4 14.6   HEMATOCRIT % 40.5 40.8 44.2   PLATELETS Thousands/uL 211 172 188   NEUTROS ABS Thousands/µL 6.41 7.12 7.79*         Results from last 7 days   Lab Units 10/16/23  0603 10/15/23  0446 10/14/23  1911 10/14/23  1838   SODIUM mmol/L 137 134*  --  133*   POTASSIUM mmol/L 4.4 4.1  --  4.7   CHLORIDE mmol/L 104 102  --  98   CO2 mmol/L 24 24  --  29   ANION GAP mmol/L 9 8  --  6   BUN mg/dL 14 14  --  14   CREATININE mg/dL 0.90 0.93  --  1.21   EGFR ml/min/1.73sq m 92 88  --  64   CALCIUM mg/dL 8.5 8.8  --  9.8   MAGNESIUM mg/dL  --   --  2.0  --          Results from last 7 days   Lab Units 10/16/23  0707 10/15/23  2037 10/15/23  1603 10/15/23  1100 10/15/23  0732 10/15/23  0022   POC GLUCOSE mg/dl 113 269* 231* 179* 205* 140     Results from last 7 days   Lab Units 10/16/23  0603 10/15/23  0446 10/14/23  1838   GLUCOSE RANDOM mg/dL 110 137 293*         Results from last 7 days   Lab Units 10/14/23  1838   HEMOGLOBIN A1C % 10.8*   EAG mg/dl 263     Results from last 7 days   Lab Units 10/15/23  0032 10/14/23  2218   HS TNI 0HR ng/L  --  8   HS TNI 2HR ng/L 8  --    HSTNI D2 ng/L 0  --          Results from last 7 days   Lab Units 10/16/23  0603 10/15/23  2341 10/15/23  1711 10/15/23  0446 10/14/23  2218   PROTIME seconds  --   --   --   --  14.0   INR   --   --   --   --  1.06   PTT seconds 78* 68* 56*   < > 27    < > = values in this interval not displayed. Results from last 7 days   Lab Units 10/16/23  0603 10/14/23  1838   PROCALCITONIN ng/ml 0.05 0.05     Results from last 7 days   Lab Units 10/14/23 1911   LACTIC ACID mmol/L 1.1     Results from last 7 days   Lab Units 10/14/23  2218   BNP pg/mL 14     Results from last 7 days   Lab Units 10/14/23  1911   INFLUENZA A PCR  Negative   INFLUENZA B PCR  Negative   RSV PCR  Negative     Results from last 7 days   Lab Units 10/14/23  1915 10/14/23  1911   BLOOD CULTURE  No Growth at 24 hrs. No Growth at 24 hrs.      ED Treatment:   Medication Administration from 10/14/2023 1549 to 10/15/2023 0012         Date/Time Order Dose Route Action     10/14/2023 1911 EDT sodium chloride 0.9 % bolus 1,000 mL 1,000 mL Intravenous New Bag     10/14/2023 1915 EDT cefepime (MAXIPIME) IVPB (premix in dextrose) 2,000 mg 50 mL 2,000 mg Intravenous New Bag     10/14/2023 1948 EDT iohexol (OMNIPAQUE) 350 MG/ML injection (SINGLE-DOSE) 100 mL 100 mL Intravenous Given     10/14/2023 2239 EDT heparin (porcine) injection 6,400 Units 6,400 Units Intravenous Given     10/14/2023 2238 EDT heparin (porcine) 25,000 units in 0.45% NaCl 250 mL infusion (premix) 18 Units/kg/hr Intravenous New Bag     10/14/2023 2230 EDT HYDROmorphone (DILAUDID) injection 0.5 mg 0.5 mg Intravenous Given          Past Medical History:   Diagnosis Date    Diabetes mellitus ([address])     Hypertension      Present on Admission:   Essential hypertension   Hypercholesterolemia   Acute pulmonary embolism without acute cor pulmonale (HCC)   Leukocytosis      Admitting Diagnosis: Cough [R05.9]  Leg pain [M79.606]  Leukocytosis [D72.829]  Pulmonary embolism (HCC) [I26.99]  DVT (deep venous thrombosis) (HCC) [I82.409]  Age/Sex: 59 y.o. male  Admission Orders:  Scheduled Medications:  amLODIPine, 5 mg, Oral, Daily  aspirin, 81 mg, Oral, Daily  atorvastatin, 40 mg, Oral, QPM  insulin glargine, 60 Units, Subcutaneous, QAM  insulin lispro, 1-6 Units, Subcutaneous, TID AC  insulin lispro, 1-6 Units, Subcutaneous, HS  lisinopril, 10 mg, Oral, Daily      Continuous IV Infusions:  heparin (porcine), 3-30 Units/kg/hr (Order-Specific), Intravenous, Titrated      PRN Meds:  acetaminophen, 650 mg, Oral, Q6H PRN  HYDROcodone-acetaminophen, 1 tablet, Oral, Q6H PRN  morphine injection, 2 mg, Intravenous, Q4H PRN  ondansetron, 4 mg, Intravenous, Q6H PRN      SCD    Network Utilization Review Department  ATTENTION: Please call with any questions or concerns to [phone] and carefully listen to the prompts so that you are directed to the right person.  All voicemails are confidential.   For Discharge needs, contact Care Management DC Support Team at [phone] opt. [phone]  Send all requests for admission clinical reviews, approved or denied determinations and any other requests to dedicated fax number below belonging to the campus where the patient is receiving treatment.  List of dedicated fax numbers for the Facilities:  [place] DENIALS (Administrative/Medical Necessity) [phone]   DISCHARGE SUPPORT TEAM (NETWORK) [id number] [address] (Maternity/NICU/Pediatrics) [phone]   [address] [place] [address] Drive [address] [place] [address] [facility] [phone] [address] 5220 [facility] [address] [address] [facility] [address] [facility] [address] [phone]

## 2023-10-16 NOTE — ASSESSMENT & PLAN NOTE
Presented for right lower extremity swelling. Venous duplex 10/14/2023: There is evidence of acute partially occlusive deep vein thrombosis noted in the femoral, popliteal, gastrocnemius, posterior tibial, and peroneal veins in the right lower limb.  Evaluation shows no evidence of thrombus in the common femoral vein in left lower limb  Appears to be unprovoked as patient describes himself is very active  Continue heparin infusion with plan to transition to Eliquis as above  Will give outpatient referral for hematology for possible hypercoagulability work-up

## 2023-10-16 NOTE — PROGRESS NOTES
[address] [facility]  Progress Note  Name: Blanka Fraser  MRN: [id number]  Unit/Bed#: ICU 02-01 I Date of Admission: 10/14/2023   Date of Service: 10/16/2023 I Hospital Day: 2    Assessment/Plan     * Acute pulmonary embolism without acute cor pulmonale [facility])  Assessment & Plan  Presented for right leg swelling and cough  CTA chest PE study 10/14/2023: Suspect acute bilateral pulmonary emboli    Case was discussed by ER provider with critical care on-call  Echocardiogram results reviewed  Continue heparin drip, plan to transition to Eliquis. Reached out to case management for price check  Saturating well on room air  Pain control as needed    Deep vein thrombosis (DVT) of right lower extremity [facility])  Assessment & Plan  Presented for right lower extremity swelling. Venous duplex 10/14/2023: There is evidence of acute partially occlusive deep vein thrombosis noted in the femoral, popliteal, gastrocnemius, posterior tibial, and peroneal veins in the right lower limb. Evaluation shows no evidence of thrombus in the common femoral vein in left lower limb  Appears to be unprovoked as patient describes himself is very active  Continue heparin infusion with plan to transition to Eliquis as above  Will give outpatient referral for hematology for possible hypercoagulability work-up    Leukocytosis  Assessment & Plan  WBCs 21.29  Afebrile  Viral swab negative  CT imaging without evidence for infection  Procalcitonin negative x2  Per chart review, patient has had elevated white count greater than 12.9 the past 3years old records.   This could be in part reactive  Monitor labs and vital signs  Serial physical assessments    Type 2 diabetes mellitus with hyperglycemia, with long-term current use of insulin [facility])  Assessment & Plan  Lab Results   Component Value Date    HGBA1C 10.8 (H) 10/14/2023       Recent Labs     10/15/23  1100 10/15/23  1603 10/15/23  2037 10/16/23  0707   POCGLU 179* 231* 269* 113 Blood Sugar Average: Last 72 hrs:  (P) 189.5    Carb controlled diet  Continue prehospital Lantus 60 units SQ every morning  Fingerstick blood sugar with sliding scale coverage      Hypercholesterolemia  Assessment & Plan  Continue atorvastatin 40 mg PO daily at dinner    Essential hypertension  Assessment & Plan  BP reviewed  Continue amlodipine 5 mg PO daily and lisinopril 10 mg PO daily             VTE Pharmacologic Prophylaxis: VTE Score: 5 High Risk (Score >/= 5) - Pharmacological DVT Prophylaxis Ordered: heparin drip. Sequential Compression Devices Ordered. Patient Centered Rounds: I performed bedside rounds with nursing staff today. Discussions with Specialists or Other Care Team Provider: Case management, SLIM physician    Education and Discussions with Family / Patient: Patient declined call to . Total Time Spent on Date of Encounter in care of patient: 50 mins. This time was spent on one or more of the following: performing physical exam; counseling and coordination of care; obtaining or reviewing history; documenting in the medical record; reviewing/ordering tests, medications or procedures; communicating with other healthcare professionals and discussing with patient's family/caregivers. Current Length of Stay: 2 day(s)  Current Patient Status: Inpatient   Certification Statement: The patient will continue to require additional inpatient hospital stay due to care coordination. Discharge Plan: Anticipate discharge later today or tomorrow to home. Code Status: Level 1 - Full Code    Subjective:   Patient seen and examined. No complaints offered other than a dry cough. He says he slept well last night, first time in a few days. He is asking when he can go home. Denies shortness of breath or chest pain.      Objective:     Vitals:   Temp (24hrs), Av °F (36.7 °C), Min:97.4 °F (36.3 °C), Max:98.3 °F (36.8 °C)    Temp:  [97.4 °F (36.3 °C)-98.3 °F (36.8 °C)] 98.3 °F (36.8 °C)  HR:  [] 88  Resp:  [21-62] 21  BP: (116-162)/(56-80) 136/71  SpO2:  [90 %-94 %] 93 %  Body mass index is 29.86 kg/m². Input and Output Summary (last 24 hours): Intake/Output Summary (Last 24 hours) at 10/16/2023 0840  Last data filed at 10/16/2023 0800  Gross per 24 hour   Intake 1104.32 ml   Output --   Net 1104.32 ml       Physical Exam:   Physical Exam  Vitals and nursing note reviewed. Constitutional:       General: He is not in acute distress. HENT:      Head: Normocephalic and atraumatic. Nose: Nose normal.      Mouth/Throat:      Mouth: Mucous membranes are moist.      Pharynx: Oropharynx is clear. Eyes:      Pupils: Pupils are equal, round, and reactive to light. Cardiovascular:      Rate and Rhythm: Normal rate and regular rhythm. Pulses: Normal pulses. Pulmonary:      Effort: Pulmonary effort is normal. No respiratory distress. Breath sounds: Normal breath sounds. Abdominal:      General: Bowel sounds are normal.      Palpations: Abdomen is soft. Tenderness: There is no abdominal tenderness. Musculoskeletal:         General: Swelling present. Cervical back: Neck supple. Right lower leg: No edema. Left lower leg: No edema. Comments: Right lower extremity swelling present   Skin:     General: Skin is warm and dry. Capillary Refill: Capillary refill takes less than 2 seconds. Neurological:      General: No focal deficit present. Mental Status: He is alert and oriented to person, place, and time.        Additional Data:     Labs:  Results from last 7 days   Lab Units 10/16/23  0603   WBC Thousand/uL 21.29*   HEMOGLOBIN g/dL 13.3   HEMATOCRIT % 40.5   PLATELETS Thousands/uL 211   NEUTROS PCT % 30*   LYMPHS PCT % 62*   MONOS PCT % 5   EOS PCT % 3     Results from last 7 days   Lab Units 10/16/23  0603   SODIUM mmol/L 137   POTASSIUM mmol/L 4.4   CHLORIDE mmol/L 104   CO2 mmol/L 24   BUN mg/dL 14   CREATININE mg/dL 0.90 ANION GAP mmol/L 9   CALCIUM mg/dL 8.5   GLUCOSE RANDOM mg/dL 110     Results from last 7 days   Lab Units 10/14/23  2218   INR  1.06     Results from last 7 days   Lab Units 10/16/23  0707 10/15/23  2037 10/15/23  1603 10/15/23  1100 10/15/23  0732 10/15/23  0022   POC GLUCOSE mg/dl 113 269* 231* 179* 205* 140     Results from last 7 days   Lab Units 10/14/23  1838   HEMOGLOBIN A1C % 10.8*     Results from last 7 days   Lab Units 10/16/23  0603 10/14/23  1911 10/14/23  1838   LACTIC ACID mmol/L  --  1.1  --    PROCALCITONIN ng/ml 0.05  --  0.05       Lines/Drains:  Invasive Devices       Peripheral Intravenous Line  Duration             Peripheral IV 10/14/23 Left Antecubital 1 day    Peripheral IV 10/15/23 Distal;Dorsal (posterior); Right Forearm 1 day                      Telemetry:  Telemetry Orders (From admission, onward)               24 Hour Telemetry Monitoring  Continuous x 24 Hours (Telem)           Question:  Reason for 24 Hour Telemetry  Answer:  Pulmonary Embolism                     Telemetry Reviewed: Normal Sinus Rhythm  Indication for Continued Telemetry Use: No indication for continued use. Will discontinue. Imaging: Reviewed radiology reports from this admission including: chest CT scan    Recent Cultures (last 7 days):   Results from last 7 days   Lab Units 10/14/23  1915 10/14/23  1911   BLOOD CULTURE  No Growth at 24 hrs. No Growth at 24 hrs.        Last 24 Hours Medication List:   Current Facility-Administered Medications   Medication Dose Route Frequency Provider Last Rate    acetaminophen  650 mg Oral Q6H PRN Víctor Dyer PA-C      amLODIPine  5 mg Oral Daily SUBHA Saini-JAKOB      aspirin  81 mg Oral Daily Herbe SUBHA Dyer-JAKOB      atorvastatin  40 mg Oral QPM Víctor Dyer PA-C      heparin (porcine)  3-30 Units/kg/hr (Order-Specific) Intravenous Titrated Jose Lerner MD 22 Units/kg/hr (10/16/23 0319)    HYDROcodone-acetaminophen  1 tablet Oral Q6H PRN Víctor Dyer ARCENIO      insulin glargine  60 Units Subcutaneous QAM Srini Muse PA-C      insulin lispro  1-6 Units Subcutaneous TID [facility] Srini Muse PA-C      insulin lispro  1-6 Units Subcutaneous HS Srini Muse PA-C      lisinopril  10 mg Oral Daily Srini Muse PA-C      morphine injection  2 mg Intravenous Q4H PRN Srini Muse PA-C      ondansetron  4 mg Intravenous Q6H PRN Srini Muse PA-C          Today, Patient Was Seen By: AKOSUA Felix    **Please Note: This note may have been constructed using a voice recognition system. **

## 2023-10-16 NOTE — ASSESSMENT & PLAN NOTE
Lab Results   Component Value Date    HGBA1C 10.8 (H) 10/14/2023       Recent Labs     10/15/23  1603 10/15/23  2037 10/16/23  0707 10/16/23  1100   POCGLU 231* 269* 113 243*         Blood Sugar Average: Last 72 hrs:  (P) [id number]    Carb controlled diet  Continue prehospital Lantus 60 units SQ every morning  Fingerstick blood sugar with sliding scale coverage

## 2023-10-16 NOTE — CASE MANAGEMENT
Case Management Assessment & Discharge Planning Note    Patient name Ijeoma Barbour  Location ICU 02/ICU - MRN [id number]  : 1965 Date 10/16/2023       Current Admission Date: 10/14/2023  Current Admission Diagnosis:Acute pulmonary embolism without acute cor pulmonale [facility])   Patient Active Problem List    Diagnosis Date Noted    Deep vein thrombosis (DVT) of right lower extremity ([address]) 10/15/2023    Acute pulmonary embolism without acute cor pulmonale ([address]) 10/14/2023    Hypercholesterolemia 2022    Former smoker 2022    Noncompliance 2021    Type 2 diabetes mellitus with hyperglycemia, with long-term current use of insulin ([address]) 2021    Headache, unspecified 2021    CVA (cerebrovascular accident) ([address]) 2021    Hyponatremia 2021    Leukocytosis 2021    Essential hypertension 2021    Abnormal nuclear stress test 2019    Microalbuminuria 2018    Elevated blood-pressure reading without diagnosis of hypertension 2018    Uncontrolled type 2 diabetes mellitus with hyperglycemia ([address]) 2018      LOS (days): 2  Geometric Mean LOS (GMLOS) (days):   Days to GMLOS:     OBJECTIVE:    Risk of Unplanned Readmission Score: 9.66     Current admission status: Inpatient    Preferred Pharmacy:   [address] [place] [address] [id number] [place] [address]  Phone: [phone] Fax: [address] [phone] [facility] [address]  [address] [facility] [address]  Phone: [phone] Fax: [phone]    Primary Care Provider: No primary care provider on file.     Primary Insurance: [facility]  Secondary Insurance:     ASSESSMENT:  [address] Representative - Spouse   Primary Phone: [phone] (Mobile)                 Advance Directives  Does patient have a [address]?: No  Was patient offered paperwork?: Yes (Declined)  Does patient currently have a Health Care decision maker?: Yes, please see Health Care Proxy section  Does patient have Advance Directives?: No  Was patient offered paperwork?: Yes (Declined)  Primary Contact: Nikkie Webb wife    Readmission Root Cause  30 Day Readmission: No    Patient Information  Admitted from[de-identified] Home  Mental Status: Alert  During Assessment patient was accompanied by: Not accompanied during assessment  Assessment information provided by[de-identified] Patient  Primary Caregiver: Self  Support Systems: Spouse/significant other  [facility]: Count includes the [facility] do you live in?: [address] [facility] entry access options.  Select all that apply.: Stairs  Number of steps to enter home.: 2  Do the steps have railings?: Yes  Type of Current Residence: 2 story home  Upon entering residence, is there a bedroom on the main floor (no further steps)?: No  A bedroom is located on the following floor levels of residence (select all that apply):: 2nd Floor  Upon entering residence, is there a bathroom on the main floor (no further steps)?: No  Indicate which floors of current residence have a bathroom (select all the apply):: 2nd Floor  Number of steps to 2nd floor from main floor: One Flight  In the last 12 months, was there a time when you were not able to pay the mortgage or rent on time?: No  In the last 12 months, how many places have you lived?: 1  In the last 12 months, was there a time when you did not have a steady place to sleep or slept in a shelter (including now)?: No  Homeless/housing insecurity resource given?: N/A  Living Arrangements: Lives w/ Spouse/significant other  Is patient a ?: No    Activities of Daily Living Prior to Admission  Functional Status: Independent  Completes ADLs independently?: Yes  Ambulates independently?: Yes  Does patient use assisted devices?: No  Does patient currently own DME?: No  Does patient have a history of Outpatient Therapy (PT/OT)?: Yes  Does the patient have a history of Short-Term Rehab?: No  Does patient have a history of HHC?: No  Does patient currently have [address]?: No    Patient Information Continued  Income Source: Employed  Does patient have prescription coverage?: No  Within the past 12 months, you worried that your food would run out before you got the money to buy more.: Never true  Within the past 12 months, the food you bought just didn't last and you didn't have money to get more.: Never true  Food insecurity resource given?: N/A  Does patient receive dialysis treatments?: No  Does patient have a history of substance abuse?: No  Does patient have a history of Mental Health Diagnosis?: No    PHQ 2/9 Screening   Reviewed PHQ 2/9 Depression Screening Score?: No    Means of Transportation  Means of Transport to Appts[de-identified] Drives Self  In the past 12 months, has lack of transportation kept you from medical appointments or from getting medications?: No  In the past 12 months, has lack of transportation kept you from meetings, work, or from getting things needed for daily living?: No  Was application for public transport provided?: N/A    DISCHARGE DETAILS:    Discharge planning discussed with[de-identified] Pt  Freedom of Choice: Yes  Comments - Freedom of Choice: Pt states he would like a referral to [facility]. Had been going to [facility] in [facility], however is requesting new MD     Contacts  Patient Contacts: Juan Carlos Eubanks wife  Relationship to Patient[de-identified] Family  Contact Method: Phone  Phone Number: [phone]  Reason/Outcome: Emergency [address]         Is the patient interested in [address] at discharge?: No    DME Referral Provided  Referral made for DME?: No    Would you like to participate in our [address] [facility] [address] service program?  : No - Declined    Treatment Team Recommendation: Home  Discharge Destination Plan[de-identified] Home  Transport at Discharge : Family    Pt will be DC home on [address]. TC to [facility].   Pt has no co-pay, medication will not be filled until 5pm.  MADe pt and SLIM aware of same.

## 2023-10-16 NOTE — DISCHARGE INSTR - AVS FIRST PAGE
Follow-up with your PCP in 2 days as discussed to check repeat labs. Back to the hospital or seek care immediately if you develop fever, chills, shortness of breath, abdominal pain, uncontrolled vomiting or diarrhea. Follow-up as an outpatient as we discussed with hematology. An ambulatory referral has been given  Please also follow-up with endocrinology as an outpatient. An ambulatory referral has been given.

## 2023-10-16 NOTE — PLAN OF CARE
Problem: PAIN - ADULT  Goal: Verbalizes/displays adequate comfort level or baseline comfort level  Description: Interventions:  - Encourage patient to monitor pain and request assistance  - Assess pain using appropriate pain scale  - Administer analgesics based on type and severity of pain and evaluate response  - Implement non-pharmacological measures as appropriate and evaluate response  - Consider cultural and social influences on pain and pain management  - Notify physician/advanced practitioner if interventions unsuccessful or patient reports new pain  Outcome: Progressing     Problem: INFECTION - ADULT  Goal: Absence or prevention of progression during hospitalization  Description: INTERVENTIONS:  - Assess and monitor for signs and symptoms of infection  - Monitor lab/diagnostic results  - Monitor all insertion sites, i.e. indwelling lines, tubes, and drains  - Monitor endotracheal if appropriate and nasal secretions for changes in amount and color  - [place] appropriate cooling/warming therapies per order  - Administer medications as ordered  - Instruct and encourage patient and family to use good hand hygiene technique  - Identify and instruct in appropriate isolation precautions for identified infection/condition  Outcome: Progressing     Problem: Knowledge Deficit  Goal: Patient/family/caregiver demonstrates understanding of disease process, treatment plan, medications, and discharge instructions  Description: Complete learning assessment and assess knowledge base.   Interventions:  - Provide teaching at level of understanding  - Provide teaching via preferred learning methods  Outcome: Progressing
Problem: Potential for Falls  Goal: Patient will remain free of falls  Description: INTERVENTIONS:  - Educate patient/family on patient safety including physical limitations  - Instruct patient to call for assistance with activity   - Consult OT/PT to assist with strengthening/mobility   - Keep Call bell within reach  - Keep bed low and locked with side rails adjusted as appropriate  - Keep care items and personal belongings within reach  - Initiate and maintain comfort rounds  - Make Fall Risk Sign visible to staff  - Apply yellow socks and bracelet for high fall risk patients  - Consider moving patient to room near nurses station  Outcome: Progressing     Problem: PAIN - ADULT  Goal: Verbalizes/displays adequate comfort level or baseline comfort level  Description: Interventions:  - Encourage patient to monitor pain and request assistance  - Assess pain using appropriate pain scale  - Administer analgesics based on type and severity of pain and evaluate response  - Implement non-pharmacological measures as appropriate and evaluate response  - Consider cultural and social influences on pain and pain management  - Notify physician/advanced practitioner if interventions unsuccessful or patient reports new pain  Outcome: Progressing
Problem: Potential for Falls  Goal: Patient will remain free of falls  Description: INTERVENTIONS:  - Educate patient/family on patient safety including physical limitations  - Instruct patient to call for assistance with activity   - Consult OT/PT to assist with strengthening/mobility   - Keep Call bell within reach  - Keep bed low and locked with side rails adjusted as appropriate  - Keep care items and personal belongings within reach  - Initiate and maintain comfort rounds  - Make Fall Risk Sign visible to staff  - Apply yellow socks and bracelet for high fall risk patients  - Consider moving patient to room near nurses station  Outcome: Progressing     Problem: PAIN - ADULT  Goal: Verbalizes/displays adequate comfort level or baseline comfort level  Description: Interventions:  - Encourage patient to monitor pain and request assistance  - Assess pain using appropriate pain scale  - Administer analgesics based on type and severity of pain and evaluate response  - Implement non-pharmacological measures as appropriate and evaluate response  - Consider cultural and social influences on pain and pain management  - Notify physician/advanced practitioner if interventions unsuccessful or patient reports new pain  Outcome: Progressing     Problem: INFECTION - ADULT  Goal: Absence or prevention of progression during hospitalization  Description: INTERVENTIONS:  - Assess and monitor for signs and symptoms of infection  - Monitor lab/diagnostic results  - Monitor all insertion sites, i.e. indwelling lines, tubes, and drains  - Monitor endotracheal if appropriate and nasal secretions for changes in amount and color  - [place] appropriate cooling/warming therapies per order  - Administer medications as ordered  - Instruct and encourage patient and family to use good hand hygiene technique  - Identify and instruct in appropriate isolation precautions for identified infection/condition  Outcome: Progressing
Problem: Potential for Falls  Goal: Patient will remain free of falls  Description: INTERVENTIONS:  - Educate patient/family on patient safety including physical limitations  - Instruct patient to call for assistance with activity   - Consult OT/PT to assist with strengthening/mobility   - Keep Call bell within reach  - Keep bed low and locked with side rails adjusted as appropriate  - Keep care items and personal belongings within reach  - Initiate and maintain comfort rounds  - Make Fall Risk Sign visible to staff  - Apply yellow socks and bracelet for high fall risk patients  - Consider moving patient to room near nurses station  Outcome: Progressing     Problem: PAIN - ADULT  Goal: Verbalizes/displays adequate comfort level or baseline comfort level  Description: Interventions:  - Encourage patient to monitor pain and request assistance  - Assess pain using appropriate pain scale  - Administer analgesics based on type and severity of pain and evaluate response  - Implement non-pharmacological measures as appropriate and evaluate response  - Consider cultural and social influences on pain and pain management  - Notify physician/advanced practitioner if interventions unsuccessful or patient reports new pain  Outcome: Progressing     Problem: SAFETY ADULT  Goal: Patient will remain free of falls  Description: INTERVENTIONS:  - Educate patient/family on patient safety including physical limitations  - Instruct patient to call for assistance with activity   - Consult OT/PT to assist with strengthening/mobility   - Keep Call bell within reach  - Keep bed low and locked with side rails adjusted as appropriate  - Keep care items and personal belongings within reach  - Initiate and maintain comfort rounds  - Make Fall Risk Sign visible to staff  - Apply yellow socks and bracelet for high fall risk patients  - Consider moving patient to room near nurses station  Outcome: Progressing
weight-bearing as tolerated

## 2023-10-17 ENCOUNTER — TELEPHONE (OUTPATIENT)
Dept: HEMATOLOGY ONCOLOGY | Facility: CLINIC | Age: 58
End: 2023-10-17

## 2023-10-17 NOTE — UTILIZATION REVIEW
NOTIFICATION OF ADMISSION DISCHARGE   This is a Notification of Discharge from [facility]. Please be advised that this patient has been discharge from our facility. Below you will find the admission and discharge date and time including the patient’s disposition. UTILIZATION REVIEW CONTACT:  Carli Stafford  Utilization   Network Utilization Review Department  Phone: [phone] carefully listen to the prompts. All voicemails are confidential.  Email: [email or web address]     ADMISSION INFORMATION  PRESENTATION DATE: 10/14/2023  5:23 PM  OBERVATION ADMISSION DATE:   INPATIENT ADMISSION DATE: 10/14/23 10:33 PM   DISCHARGE DATE: 10/16/2023  3:14 PM   DISPOSITION:Home/Self Care    Network Utilization Review Department  ATTENTION: Please call with any questions or concerns to [phone] and carefully listen to the prompts so that you are directed to the right person. All voicemails are confidential.   For Discharge needs, contact Care Management DC Support Team at [phone] opt. 2  Send all requests for admission clinical reviews, approved or denied determinations and any other requests to dedicated fax number below belonging to the campus where the patient is receiving treatment.  List of dedicated fax numbers for the Facilities:  [place] DENIALS (Administrative/Medical Necessity) [phone]   DISCHARGE SUPPORT TEAM (Network) [phone] [address] [facility] (Maternity/NICU/Pediatrics) [phone]   [address] [facility] [address] [phone] [address] [facility] [phone]   [address] [place] [address]  [facility] [phone]

## 2023-10-17 NOTE — TELEPHONE ENCOUNTER
I called Alberto Vincent in response to a referral that was received for patient to establish care with Hematology. Outreach was made to schedule a consultation. I left a voicemail explaining the reason for my call and advised patient to call [facility] at [phone]. Another attempt will be made to contact patient.

## 2023-10-18 ENCOUNTER — TELEPHONE (OUTPATIENT)
Dept: HEMATOLOGY ONCOLOGY | Facility: CLINIC | Age: 58
End: 2023-10-18

## 2023-10-18 ENCOUNTER — TELEPHONE (OUTPATIENT)
Dept: ENDOCRINOLOGY | Facility: CLINIC | Age: 58
End: 2023-10-18

## 2023-10-18 NOTE — TELEPHONE ENCOUNTER
Received referral for Karime Solorzano. Called for patient to contact office to schedule Consult/New Patient Appointment. No answer.

## 2023-10-18 NOTE — TELEPHONE ENCOUNTER
I called Deepthi Hernandez in response to a referral that was received for patient to establish care with Hematology. Outreach was made to schedule a consultation. I left a voicemail explaining the reason for my call and advised patient to call [facility] at [phone]. Another attempt will be made to contact patient.

## 2023-10-19 ENCOUNTER — TELEPHONE (OUTPATIENT)
Dept: HEMATOLOGY ONCOLOGY | Facility: CLINIC | Age: 58
End: 2023-10-19

## 2023-10-19 NOTE — TELEPHONE ENCOUNTER
I called Samina Mendez in response to a referral that was received for patient to establish care with Hematology. Outreach was made to schedule a consultation. I left a voicemail explaining the reason for my call and advised patient to call [facility] at [phone]. This is the third attempt to schedule patient unsuccessfully. The referral has been closed, a [employer] [facility] message has been sent to patient (if applicable) and a letter has been sent to the address on file.

## 2023-10-20 LAB
BACTERIA BLD CULT: NORMAL
BACTERIA BLD CULT: NORMAL

## 2023-11-24 ENCOUNTER — TELEPHONE (OUTPATIENT)
Dept: CARDIOLOGY CLINIC | Facility: HOSPITAL | Age: 58
End: 2023-11-24

## 2024-01-27 ENCOUNTER — APPOINTMENT (OUTPATIENT)
Dept: LAB | Facility: CLINIC | Age: 59
End: 2024-01-27
Payer: COMMERCIAL

## 2024-01-27 DIAGNOSIS — E11.00 TYPE II DIABETES MELLITUS WITH HYPEROSMOLARITY, UNCONTROLLED (HCC): ICD-10-CM

## 2024-01-27 DIAGNOSIS — C91.90 LYMPHOID LEUKEMIA NOT HAVING ACHIEVED REMISSION, UNSPECIFIED LYMPHOID LEUKEMIA TYPE (HCC): ICD-10-CM

## 2024-01-27 DIAGNOSIS — I26.99 PULMONARY EMBOLISM WITHOUT ACUTE COR PULMONALE, UNSPECIFIED CHRONICITY, UNSPECIFIED PULMONARY EMBOLISM TYPE (HCC): Primary | ICD-10-CM

## 2024-01-27 DIAGNOSIS — E11.65 TYPE II DIABETES MELLITUS WITH HYPEROSMOLARITY, UNCONTROLLED (HCC): ICD-10-CM

## 2024-01-27 DIAGNOSIS — R80.9 MICROALBUMINURIA: ICD-10-CM

## 2024-01-27 PROCEDURE — 88185 FLOWCYTOMETRY/TC ADD-ON: CPT

## 2024-01-27 PROCEDURE — 88184 FLOWCYTOMETRY/ TC 1 MARKER: CPT

## 2024-01-27 PROCEDURE — 86146 BETA-2 GLYCOPROTEIN ANTIBODY: CPT

## 2024-01-27 PROCEDURE — 36415 COLL VENOUS BLD VENIPUNCTURE: CPT

## 2024-01-27 PROCEDURE — 86147 CARDIOLIPIN ANTIBODY EA IG: CPT

## 2024-01-30 LAB
B2 GLYCOPROT1 IGA SERPL IA-ACNC: 2.2
B2 GLYCOPROT1 IGG SERPL IA-ACNC: 1.3
B2 GLYCOPROT1 IGM SERPL IA-ACNC: 7
CARDIOLIPIN IGA SER IA-ACNC: 4.1
CARDIOLIPIN IGG SER IA-ACNC: 1.4
CARDIOLIPIN IGM SER IA-ACNC: 15
SCAN RESULT: NORMAL

## 2024-02-09 LAB — MISCELLANEOUS LAB TEST RESULT: NORMAL

## 2025-03-17 ENCOUNTER — APPOINTMENT (EMERGENCY)
Dept: CT IMAGING | Facility: HOSPITAL | Age: 60
End: 2025-03-17
Payer: COMMERCIAL

## 2025-03-17 ENCOUNTER — HOSPITAL ENCOUNTER (EMERGENCY)
Facility: HOSPITAL | Age: 60
Discharge: HOME | End: 2025-03-17
Attending: FAMILY MEDICINE | Admitting: FAMILY MEDICINE
Payer: COMMERCIAL

## 2025-03-17 VITALS
RESPIRATION RATE: 20 BRPM | OXYGEN SATURATION: 96 % | DIASTOLIC BLOOD PRESSURE: 72 MMHG | HEART RATE: 74 BPM | SYSTOLIC BLOOD PRESSURE: 138 MMHG | WEIGHT: 207.23 LBS | TEMPERATURE: 98 F | BODY MASS INDEX: 33.3 KG/M2 | HEIGHT: 66 IN

## 2025-03-17 DIAGNOSIS — G51.0 BELL'S PALSY: Primary | ICD-10-CM

## 2025-03-17 DIAGNOSIS — G93.89 CALCIFICATION OF BRAIN: ICD-10-CM

## 2025-03-17 LAB
ALBUMIN SERPL BCG-MCNC: 4.4 G/DL (ref 3.5–5)
ALP SERPL-CCNC: 45 U/L (ref 34–104)
ALT SERPL W P-5'-P-CCNC: 25 U/L (ref 7–52)
ANION GAP SERPL CALCULATED.3IONS-SCNC: 9 MMOL/L (ref 4–13)
AST SERPL W P-5'-P-CCNC: 13 U/L (ref 13–39)
BASOPHILS # BLD AUTO: 0.11 THOUSANDS/ÂΜL (ref 0–0.1)
BASOPHILS NFR BLD AUTO: 0 % (ref 0–1)
BILIRUB SERPL-MCNC: 0.54 MG/DL (ref 0.2–1)
BUN SERPL-MCNC: 16 MG/DL (ref 5–25)
CALCIUM SERPL-MCNC: 9.1 MG/DL (ref 8.4–10.2)
CHLORIDE SERPL-SCNC: 101 MMOL/L (ref 96–108)
CO2 SERPL-SCNC: 24 MMOL/L (ref 21–32)
CREAT SERPL-MCNC: 0.94 MG/DL (ref 0.6–1.3)
EOSINOPHIL # BLD AUTO: 0.41 THOUSAND/ÂΜL (ref 0–0.61)
EOSINOPHIL NFR BLD AUTO: 2 % (ref 0–6)
ERYTHROCYTE [DISTWIDTH] IN BLOOD BY AUTOMATED COUNT: 12.7 % (ref 11.6–15.1)
FLUAV AG UPPER RESP QL IA.RAPID: NEGATIVE
FLUBV AG UPPER RESP QL IA.RAPID: NEGATIVE
GFR SERPL CREATININE-BSD FRML MDRD: 87 ML/MIN/1.73SQ M
GLUCOSE SERPL-MCNC: 250 MG/DL (ref 65–140)
HCT VFR BLD AUTO: 44.8 % (ref 36.5–49.3)
HGB BLD-MCNC: 14.9 G/DL (ref 12–17)
IMM GRANULOCYTES # BLD AUTO: 0.09 THOUSAND/UL (ref 0–0.2)
IMM GRANULOCYTES NFR BLD AUTO: 0 % (ref 0–2)
LYMPHOCYTES # BLD AUTO: 19.99 THOUSANDS/ÂΜL (ref 0.6–4.47)
LYMPHOCYTES NFR BLD AUTO: 74 % (ref 14–44)
MCH RBC QN AUTO: 31.1 PG (ref 26.8–34.3)
MCHC RBC AUTO-ENTMCNC: 33.3 G/DL (ref 31.4–37.4)
MCV RBC AUTO: 94 FL (ref 82–98)
MONOCYTES # BLD AUTO: 0.69 THOUSAND/ÂΜL (ref 0.17–1.22)
MONOCYTES NFR BLD AUTO: 3 % (ref 4–12)
NEUTROPHILS # BLD AUTO: 5.73 THOUSANDS/ÂΜL (ref 1.85–7.62)
NEUTS SEG NFR BLD AUTO: 21 % (ref 43–75)
NRBC BLD AUTO-RTO: 0 /100 WBCS
PLATELET # BLD AUTO: 248 THOUSANDS/UL (ref 149–390)
PMV BLD AUTO: 9.8 FL (ref 8.9–12.7)
POTASSIUM SERPL-SCNC: 4.4 MMOL/L (ref 3.5–5.3)
PROT SERPL-MCNC: 7.3 G/DL (ref 6.4–8.4)
RBC # BLD AUTO: 4.79 MILLION/UL (ref 3.88–5.62)
SARS-COV+SARS-COV-2 AG RESP QL IA.RAPID: NEGATIVE
SODIUM SERPL-SCNC: 134 MMOL/L (ref 135–147)
WBC # BLD AUTO: 27.02 THOUSAND/UL (ref 4.31–10.16)

## 2025-03-17 PROCEDURE — 86618 LYME DISEASE ANTIBODY: CPT

## 2025-03-17 PROCEDURE — 70496 CT ANGIOGRAPHY HEAD: CPT

## 2025-03-17 PROCEDURE — 99284 EMERGENCY DEPT VISIT MOD MDM: CPT

## 2025-03-17 PROCEDURE — 36415 COLL VENOUS BLD VENIPUNCTURE: CPT

## 2025-03-17 PROCEDURE — 70498 CT ANGIOGRAPHY NECK: CPT

## 2025-03-17 PROCEDURE — 80053 COMPREHEN METABOLIC PANEL: CPT

## 2025-03-17 PROCEDURE — 85025 COMPLETE CBC W/AUTO DIFF WBC: CPT

## 2025-03-17 PROCEDURE — 87804 INFLUENZA ASSAY W/OPTIC: CPT

## 2025-03-17 PROCEDURE — 87811 SARS-COV-2 COVID19 W/OPTIC: CPT

## 2025-03-17 PROCEDURE — 99285 EMERGENCY DEPT VISIT HI MDM: CPT | Performed by: FAMILY MEDICINE

## 2025-03-17 RX ORDER — VALACYCLOVIR HYDROCHLORIDE 1 G/1
1000 TABLET, FILM COATED ORAL 3 TIMES DAILY
Qty: 21 TABLET | Refills: 0 | Status: SHIPPED | OUTPATIENT
Start: 2025-03-17 | End: 2025-03-24

## 2025-03-17 RX ORDER — ERYTHROMYCIN 5 MG/G
OINTMENT OPHTHALMIC
Qty: 2 G | Refills: 0 | Status: SHIPPED | OUTPATIENT
Start: 2025-03-17

## 2025-03-17 RX ORDER — ACETAMINOPHEN 325 MG/1
975 TABLET ORAL ONCE
Status: COMPLETED | OUTPATIENT
Start: 2025-03-17 | End: 2025-03-17

## 2025-03-17 RX ORDER — PREDNISONE 20 MG/1
60 TABLET ORAL ONCE
Status: COMPLETED | OUTPATIENT
Start: 2025-03-17 | End: 2025-03-17

## 2025-03-17 RX ORDER — PREDNISONE 20 MG/1
60 TABLET ORAL DAILY
Qty: 18 TABLET | Refills: 0 | Status: SHIPPED | OUTPATIENT
Start: 2025-03-17 | End: 2025-03-23

## 2025-03-17 RX ORDER — POLYVINYL ALCOHOL 14 MG/ML
1 SOLUTION/ DROPS OPHTHALMIC
Qty: 13 EACH | Refills: 0 | Status: SHIPPED | OUTPATIENT
Start: 2025-03-17 | End: 2025-04-01

## 2025-03-17 RX ORDER — VALACYCLOVIR HYDROCHLORIDE 500 MG/1
1000 TABLET, FILM COATED ORAL EVERY 8 HOURS SCHEDULED
Status: DISCONTINUED | OUTPATIENT
Start: 2025-03-17 | End: 2025-03-17 | Stop reason: HOSPADM

## 2025-03-17 RX ADMIN — VALACYCLOVIR HYDROCHLORIDE 1000 MG: 500 TABLET, FILM COATED ORAL at 10:02

## 2025-03-17 RX ADMIN — ACETAMINOPHEN 975 MG: 325 TABLET ORAL at 10:02

## 2025-03-17 RX ADMIN — IOHEXOL 90 ML: 350 INJECTION, SOLUTION INTRAVENOUS at 10:33

## 2025-03-17 RX ADMIN — PREDNISONE 60 MG: 20 TABLET ORAL at 10:01

## 2025-03-17 NOTE — ED PROVIDER NOTES
"  ED Disposition       None          Assessment & Plan       Medical Decision Making  Patient is a 59 y.o M with a PMH of DM, HTN, CLL, CVA rpresenting to the ER complaining of left sided facial droop, headache, and tearing in left eye for the last three days. Patient in no acute distress. Physical exam revealing left sided facial droop that does not spare forehead. Left eye is injected/tearing, able to close left eye but with difficulty. No other neurologic deficits noted on physical exam, patient has Bell's Palsy based on exam.    Will order basic labs, lyme titer, ekg. Will give valacyclovir/prednisone. Given patient's history and complaint of headache, will order CTA head/neck.     EKG showing NSR at 73 BPM; no signs of arrhythmia/ischemia on my interpretation; appears unchanged from previous EKG. White count elevated, this is patient's baseline/and hx of CLL. CTA head and neck showing \"CT Brain:  No acute intracranial abnormality. Unchanged chronic small infarct in right parietal and right occipital lobes with mild chronic microangiopathy. Slightly more pronounced amorphous calcification in right parietal deep white matter, unclear etiology. Consider follow-up MRI brain with and without contrast for further evaluation. CT Angiography: Negative CTA head and neck for large vessel occlusion, dissection, aneurysm, or high-grade stenosis. Atherosclerotic disease, as detailed above.\" Discussed with on call neurologist who recommended outpatient follow up with neurology for MRI to better characterize calcification seen on CT.     Discussed recommendations for follow up with neurology with patient who is agreeable. Recommended patient take valacyclovir/prednisone as prescribed for Bell's Palsy. Recommended patient apply eye patch and use eye drops every hour while awake to prevent injury to affected eye. Recommended patient apply ophthalmic ointment at night and tape eye shut to prevent injury to affected eye. " Recommended patient follow up with ophthalmologist/PT for further management/evaluation. Provided discharge instructions on Bell's Palsy. Recommended patient return to the ER if symptoms worsen or change.     Amount and/or Complexity of Data Reviewed  Labs: ordered.  Radiology: ordered.    Risk  OTC drugs.  Prescription drug management.             Medications   acetaminophen (TYLENOL) tablet 975 mg (has no administration in time range)   predniSONE tablet 60 mg (has no administration in time range)   valACYclovir (VALTREX) tablet 1,000 mg (has no administration in time range)       ED Risk Strat Scores                            SBIRT 20yo+      Flowsheet Row Most Recent Value   Initial Alcohol Screen: US AUDIT-C     1. How often do you have a drink containing alcohol? 6 Filed at: 03/17/2025 0918   2. How many drinks containing alcohol do you have on a typical day you are drinking?  2 Filed at: 03/17/2025 0918   3a. Male UNDER 65: How often do you have five or more drinks on one occasion? 6 Filed at: 03/17/2025 0918   Audit-C Score 14 Filed at: 03/17/2025 0918   Full Alcohol Screen: US AUDIT    4. How often during the last year have you found that you were not able to stop drinking once you had started? 0 Filed at: 03/17/2025 0918   5. How often during past year have you failed to do what was normally expected of you because of drinking?  0 Filed at: 03/17/2025 0918   6. How often in past year have you needed a first drink in the morning to get yourself going after a heavy drinking session?  0 Filed at: 03/17/2025 0918   7. How often in past year have you had feeling of guilt or remorse after drinking?  0 Filed at: 03/17/2025 0918   8. How often in past year have you been unable to remember what happened night before because you had been drinking?  0 Filed at: 03/17/2025 0918   9. Have you or someone else been injured as a result of your drinking?  0 Filed at: 03/17/2025 0918   10. Has a relative, friend, doctor or  "other health worker been concerned about your drinking and suggested you cut down?  0 Filed at: 03/17/2025 0918   AUDIT Total Score 14 Filed at: 03/17/2025 0918   PATY: How many times in the past year have you...    Used an illegal drug or used a prescription medication for non-medical reasons? Never Filed at: 03/17/2025 0918                            History of Present Illness       Chief Complaint   Patient presents with    Facial Droop     Left sided facial droop started Saturday.       Past Medical History:   Diagnosis Date    Diabetes mellitus (HCC)     Hypertension       Past Surgical History:   Procedure Laterality Date    CARDIAC SURGERY        History reviewed. No pertinent family history.   Social History     Tobacco Use    Smoking status: Never    Smokeless tobacco: Never   Vaping Use    Vaping status: Never Used   Substance Use Topics    Alcohol use: Yes     Comment: \"a couple of beer per day\"    Drug use: Never      E-Cigarette/Vaping    E-Cigarette Use Never User       E-Cigarette/Vaping Substances      I have reviewed and agree with the history as documented.     Patient is a 59 y.o M with a PMH of DM, HTN, CLL, CVA rpresenting to the ER complaining of left sided facial droop, headache, and tearing in left eye for the last three days. Patient states symptoms came on suddenly three days ago and have been present since. Patient states symptoms were not preceded by any viral syndromes. Patient denies recent tick bites/diagnoses of lyme disease. Patient states headache has been intermittent over the last couple of days and is on the left side of his head. Patient reports tearing in his left eye and states he is having difficulty shutting eye completely. Patient denies chest pain, dizziness, new hearing loss/tinnitus, decreased vision, hx of cold sores, weakness/decreased sensation, numbness/tingling, syncope.           Review of Systems   Constitutional:  Negative for chills and fever.   HENT:  Negative " for ear pain and sore throat.    Eyes:  Negative for pain and visual disturbance.   Respiratory:  Negative for cough and shortness of breath.    Cardiovascular:  Negative for chest pain, palpitations and leg swelling.   Gastrointestinal:  Negative for abdominal pain, diarrhea, nausea and vomiting.   Genitourinary:  Negative for dysuria and hematuria.   Musculoskeletal:  Negative for arthralgias and back pain.   Skin:  Negative for color change and rash.   Neurological:  Positive for facial asymmetry and headaches. Negative for dizziness, seizures, syncope, weakness and numbness.   All other systems reviewed and are negative.          Objective       ED Triage Vitals   Temperature Pulse Blood Pressure Respirations SpO2 Patient Position - Orthostatic VS   03/17/25 0920 03/17/25 0915 03/17/25 0915 03/17/25 0915 03/17/25 0915 03/17/25 0915   98.2 °F (36.8 °C) 92 (!) 240/109 16 95 % Sitting      Temp Source Heart Rate Source BP Location FiO2 (%) Pain Score    03/17/25 0920 03/17/25 0915 03/17/25 0915 -- 03/17/25 0915    Oral Monitor Left arm  5      Vitals      Date and Time Temp Pulse SpO2 Resp BP Pain Score FACES Pain Rating User   03/17/25 0920 98.2 °F (36.8 °C) -- -- -- -- -- -- TG   03/17/25 0915 -- 92 95 % 16 240/109 5 -- TG            Physical Exam  Vitals and nursing note reviewed.   Constitutional:       General: He is not in acute distress.     Appearance: He is well-developed. He is not ill-appearing or toxic-appearing.   HENT:      Head: Normocephalic and atraumatic.      Right Ear: Tympanic membrane, ear canal and external ear normal.      Left Ear: Tympanic membrane, ear canal and external ear normal.      Ears:      Comments: No vesicles noted in external canal or on TM BL  Eyes:      General: No visual field deficit.     Conjunctiva/sclera: Conjunctivae normal.   Cardiovascular:      Rate and Rhythm: Normal rate and regular rhythm.      Pulses: Normal pulses.      Heart sounds: Normal heart sounds. No  murmur heard.     No friction rub. No gallop.   Pulmonary:      Effort: Pulmonary effort is normal. No respiratory distress.      Breath sounds: Normal breath sounds. No wheezing or rales.   Abdominal:      General: Abdomen is flat.      Palpations: Abdomen is soft.      Tenderness: There is no abdominal tenderness.   Musculoskeletal:         General: No swelling.      Cervical back: Normal range of motion and neck supple.   Skin:     General: Skin is warm and dry.      Capillary Refill: Capillary refill takes less than 2 seconds.   Neurological:      Mental Status: He is alert and oriented to person, place, and time. Mental status is at baseline.      GCS: GCS eye subscore is 4. GCS verbal subscore is 5. GCS motor subscore is 6.      Cranial Nerves: Facial asymmetry present. No dysarthria.      Sensory: Sensation is intact. No sensory deficit.      Motor: Motor function is intact. No pronator drift.      Coordination: Coordination is intact. Finger-Nose-Finger Test normal.      Gait: Gait is intact. Gait normal.      Comments: Left sided facial droop that does not spare the forehead is noted on exam, no other neurologic deficits noted.    Psychiatric:         Mood and Affect: Mood normal.         Behavior: Behavior normal.         Thought Content: Thought content normal.         Judgment: Judgment normal.         Results Reviewed       Procedure Component Value Units Date/Time    CBC and differential [[id number]]     Lab Status: No result Specimen: Blood     Comprehensive metabolic panel [[id number]]     Lab Status: No result Specimen: Blood     LYME TOTAL AB W REFLEX TO IGM/IGG [[id number]]     Lab Status: No result Specimen: Blood     Narrative:      The following orders were created for panel order LYME TOTAL AB W REFLEX TO IGM/IGG.  Procedure                               Abnormality         Status                     ---------                               -----------         ------                     Lyme Total  AB W Reflex t...[[id number]]                                                   Please view results for these tests on the individual orders.    FLU/COVID Rapid Antigen (30 min. TAT) - Preferred screening test in ED [[id number]]     Lab Status: No result Specimen: Nares from Nose     Lyme Total AB W Reflex to IGM/IGG [[id number]]     Lab Status: No result Specimen: Blood             CTA head and neck with and without contrast    (Results Pending)       Procedures    ED Medication and Procedure Management   Prior to Admission Medications   Prescriptions Last Dose Informant Patient Reported? Taking?   Droplet Pen Needles 31G X 8 MM MISC   Yes No   Sig: INJECT ONCE DAILY   Lantus SoloStar 100 units/mL injection pen  Self Yes No   Sig: INJECT 60 UNITS UNDER THE SKIN DAILY.                            ...  (REFER TO PRESCRIPTION NOTES).   amLODIPine (NORVASC) 2.5 mg tablet   Yes No   Sig: Take 5 mg by mouth daily   apixaban (Eliquis) 5 mg   No No   Sig: Take 2 tablets (10 mg total) by mouth 2 (two) times a day for 7 days, THEN 1 tablet (5 mg total) 2 (two) times a day for 23 days.   aspirin 81 mg chewable tablet  Self No No   Sig: Chew 1 tablet (81 mg total) daily   atorvastatin (LIPITOR) 40 mg tablet   No No   Sig: Take 1 tablet (40 mg total) by mouth every evening   lisinopril (ZESTRIL) 10 mg tablet   No No   Sig: Take 1 tablet (10 mg total) by mouth daily   metFORMIN (GLUCOPHAGE) 1000 MG tablet  Self Yes No   Sig: Take 1 tablet by mouth daily   Patient not taking: Reported on 10/14/2023      Facility-Administered Medications: None     Patient's Medications   Discharge Prescriptions    No medications on file     No discharge procedures on file.  ED SEPSIS DOCUMENTATION            Neymar Villa PA-C  03/17/25 1524

## 2025-03-17 NOTE — ED ATTENDING ATTESTATION
3/17/2025  I, Amos Thompson MD, saw and evaluated the patient. I have discussed the patient with the resident/non-physician practitioner and agree with the resident's/non-physician practitioner's findings, Plan of Care, and MDM as documented in the resident's/non-physician practitioner's note, except where noted. All available labs and Radiology studies were reviewed.  I was present for key portions of any procedure(s) performed by the resident/non-physician practitioner and I was immediately available to provide assistance.       At this point I agree with the current assessment done in the Emergency Department.  I have conducted an independent evaluation of this patient a history and physical is as follows:    ED Course     59-year-old male presented to ED with the complaint of left-sided facial inability to close the left side of his eye.  Symptoms started 2 days ago.  Patient seen examined at bedside awake alert oriented GCS 15  Left-sided facial asymmetry noted  Physical exam  Awake alert  Cardiovascular regular rhythm  Lungs clear to auscultation  Neuro left-sided symmetry noted able to close the eye  Assessment and plan  Recommended to obtain labs CT head start patient on antiviral/high-dose steroid follow-up with PT neurology obtain Lyme titer.  Patient stable DC home      Critical Care Time  Procedures

## 2025-03-17 NOTE — DISCHARGE INSTRUCTIONS
Please apply artificial every hour while awake.  Please apply eye patch to prevent trauma to the eye.  Please apply erythromycin ointment at night and tape eyelid shut to prevent trauma to the eye during sleep.  Please take valacyclovir and prednisone as prescribed. Please follow up with PT for further management/evaluation of Bell's Palsy. Please follow-up with ophthalmology for further management/evaluation of left eye. Please follow up with neurology for further management/imaging of calcification seen on CT imaging. Please return to the ER if symptoms worsen or change.

## 2025-03-18 LAB — B BURGDOR IGG+IGM SER QL IA: NEGATIVE
